# Patient Record
Sex: FEMALE | Race: BLACK OR AFRICAN AMERICAN | NOT HISPANIC OR LATINO | Employment: FULL TIME | URBAN - METROPOLITAN AREA
[De-identification: names, ages, dates, MRNs, and addresses within clinical notes are randomized per-mention and may not be internally consistent; named-entity substitution may affect disease eponyms.]

---

## 2023-04-13 ENCOUNTER — OFFICE VISIT (OUTPATIENT)
Dept: PRIMARY CARE | Facility: CLINIC | Age: 48
End: 2023-04-13
Payer: COMMERCIAL

## 2023-04-13 VITALS
SYSTOLIC BLOOD PRESSURE: 109 MMHG | HEIGHT: 64 IN | BODY MASS INDEX: 47.29 KG/M2 | TEMPERATURE: 97.4 F | HEART RATE: 92 BPM | WEIGHT: 277 LBS | DIASTOLIC BLOOD PRESSURE: 74 MMHG

## 2023-04-13 DIAGNOSIS — G47.30 SLEEP APNEA IN ADULT: ICD-10-CM

## 2023-04-13 DIAGNOSIS — E78.9 LIPID DISORDER: ICD-10-CM

## 2023-04-13 DIAGNOSIS — E11.69 TYPE 2 DIABETES MELLITUS WITH OTHER SPECIFIED COMPLICATION, UNSPECIFIED WHETHER LONG TERM INSULIN USE (MULTI): Primary | ICD-10-CM

## 2023-04-13 DIAGNOSIS — I10 HYPERTENSION, UNSPECIFIED TYPE: ICD-10-CM

## 2023-04-13 PROCEDURE — 3008F BODY MASS INDEX DOCD: CPT | Performed by: FAMILY MEDICINE

## 2023-04-13 PROCEDURE — 99214 OFFICE O/P EST MOD 30 MIN: CPT | Performed by: FAMILY MEDICINE

## 2023-04-13 PROCEDURE — 3078F DIAST BP <80 MM HG: CPT | Performed by: FAMILY MEDICINE

## 2023-04-13 PROCEDURE — 3074F SYST BP LT 130 MM HG: CPT | Performed by: FAMILY MEDICINE

## 2023-04-13 PROCEDURE — 1036F TOBACCO NON-USER: CPT | Performed by: FAMILY MEDICINE

## 2023-04-13 RX ORDER — METFORMIN HYDROCHLORIDE 1000 MG/1
1 TABLET ORAL 2 TIMES DAILY
COMMUNITY
Start: 2022-12-14 | End: 2023-07-18 | Stop reason: WASHOUT

## 2023-04-13 RX ORDER — TRIAMTERENE/HYDROCHLOROTHIAZID 37.5-25 MG
1 TABLET ORAL DAILY
COMMUNITY
Start: 2019-11-11 | End: 2024-02-28

## 2023-04-13 RX ORDER — METOPROLOL SUCCINATE 50 MG/1
1 TABLET, EXTENDED RELEASE ORAL DAILY
COMMUNITY
Start: 2021-04-30 | End: 2023-08-08

## 2023-04-13 RX ORDER — ESCITALOPRAM OXALATE 20 MG/1
20 TABLET ORAL DAILY
COMMUNITY
Start: 2023-03-07 | End: 2024-02-28

## 2023-04-13 RX ORDER — DULAGLUTIDE 3 MG/.5ML
INJECTION, SOLUTION SUBCUTANEOUS
COMMUNITY
Start: 2023-03-20 | End: 2023-08-14 | Stop reason: SDUPTHER

## 2023-04-13 RX ORDER — MONTELUKAST SODIUM 10 MG/1
1 TABLET ORAL DAILY
COMMUNITY
Start: 2022-06-21 | End: 2023-08-08

## 2023-04-13 RX ORDER — COLESTIPOL HYDROCHLORIDE 5 G/5G
5 GRANULE, FOR SUSPENSION ORAL DAILY
COMMUNITY
Start: 2022-12-14 | End: 2023-04-13

## 2023-04-13 RX ORDER — AMLODIPINE BESYLATE 5 MG/1
1 TABLET ORAL DAILY
COMMUNITY
Start: 2019-11-11 | End: 2023-04-13 | Stop reason: WASHOUT

## 2023-04-13 NOTE — PROGRESS NOTES
This is a 47-year-old female patient who is morbidly obese BMI of 47.55 sleep apnea diabetes hypertension    Today she has lost about 7 pounds and her blood pressure is very good I will discontinue the amlodipine    She will continue with the triamterene and metoprolol    She is taking Trulicity once a week 3 mg dose she is also taking the Lexapro and metformin 1000 once a day    I informed her if she does weight training and also do walking at least 45 minutes every day she will lose more weight    She says on and off she has been keeping a journal but has not been consistent encourage her to do so    She says CPAP she has been using it but certain days she might not use it    I referred her to the ophthalmologist    Since the lab is closed for the day she promised to come tomorrow for the lipid and A1c panel    Advised patient to come back in 3  months

## 2023-04-14 ENCOUNTER — APPOINTMENT (OUTPATIENT)
Dept: PRIMARY CARE | Facility: CLINIC | Age: 48
End: 2023-04-14
Payer: COMMERCIAL

## 2023-04-20 ENCOUNTER — CLINICAL SUPPORT (OUTPATIENT)
Dept: PRIMARY CARE | Facility: CLINIC | Age: 48
End: 2023-04-20
Payer: COMMERCIAL

## 2023-04-20 DIAGNOSIS — E11.69 TYPE 2 DIABETES MELLITUS WITH OTHER SPECIFIED COMPLICATION, UNSPECIFIED WHETHER LONG TERM INSULIN USE (MULTI): ICD-10-CM

## 2023-04-20 DIAGNOSIS — E78.9 LIPID DISORDER: ICD-10-CM

## 2023-04-20 PROCEDURE — 80061 LIPID PANEL: CPT

## 2023-04-20 PROCEDURE — 83036 HEMOGLOBIN GLYCOSYLATED A1C: CPT

## 2023-04-20 PROCEDURE — 36415 COLL VENOUS BLD VENIPUNCTURE: CPT | Performed by: FAMILY MEDICINE

## 2023-04-21 ENCOUNTER — E-VISIT (OUTPATIENT)
Dept: PRIMARY CARE | Facility: CLINIC | Age: 48
End: 2023-04-21
Payer: COMMERCIAL

## 2023-04-21 DIAGNOSIS — T78.40XA ALLERGIC REACTION, INITIAL ENCOUNTER: Primary | ICD-10-CM

## 2023-04-21 DIAGNOSIS — T78.40XD ALLERGIC REACTION, SUBSEQUENT ENCOUNTER: Primary | ICD-10-CM

## 2023-04-21 LAB
CHOLESTEROL (MG/DL) IN SER/PLAS: 161 MG/DL (ref 0–199)
CHOLESTEROL IN HDL (MG/DL) IN SER/PLAS: 47.3 MG/DL
CHOLESTEROL/HDL RATIO: 3.4
ESTIMATED AVERAGE GLUCOSE FOR HBA1C: 131 MG/DL
HEMOGLOBIN A1C/HEMOGLOBIN TOTAL IN BLOOD: 6.2 %
LDL: 92 MG/DL (ref 0–99)
TRIGLYCERIDE (MG/DL) IN SER/PLAS: 109 MG/DL (ref 0–149)
VLDL: 22 MG/DL (ref 0–40)

## 2023-04-21 PROCEDURE — 99213 OFFICE O/P EST LOW 20 MIN: CPT | Performed by: STUDENT IN AN ORGANIZED HEALTH CARE EDUCATION/TRAINING PROGRAM

## 2023-04-21 RX ORDER — PREDNISONE 20 MG/1
40 TABLET ORAL DAILY
Qty: 10 TABLET | Refills: 0 | Status: SHIPPED | OUTPATIENT
Start: 2023-04-21 | End: 2023-04-26

## 2023-07-18 ENCOUNTER — OFFICE VISIT (OUTPATIENT)
Dept: PRIMARY CARE | Facility: CLINIC | Age: 48
End: 2023-07-18
Payer: COMMERCIAL

## 2023-07-18 VITALS
HEIGHT: 64 IN | DIASTOLIC BLOOD PRESSURE: 84 MMHG | HEART RATE: 85 BPM | TEMPERATURE: 97.4 F | BODY MASS INDEX: 46.95 KG/M2 | SYSTOLIC BLOOD PRESSURE: 122 MMHG | WEIGHT: 275 LBS

## 2023-07-18 DIAGNOSIS — I10 HYPERTENSION, UNSPECIFIED TYPE: ICD-10-CM

## 2023-07-18 DIAGNOSIS — T78.40XA ALLERGIC REACTION, INITIAL ENCOUNTER: ICD-10-CM

## 2023-07-18 DIAGNOSIS — E11.69 TYPE 2 DIABETES MELLITUS WITH OTHER SPECIFIED COMPLICATION, UNSPECIFIED WHETHER LONG TERM INSULIN USE (MULTI): Primary | ICD-10-CM

## 2023-07-18 DIAGNOSIS — G47.30 SLEEP APNEA IN ADULT: ICD-10-CM

## 2023-07-18 PROCEDURE — 3008F BODY MASS INDEX DOCD: CPT | Performed by: FAMILY MEDICINE

## 2023-07-18 PROCEDURE — 3074F SYST BP LT 130 MM HG: CPT | Performed by: FAMILY MEDICINE

## 2023-07-18 PROCEDURE — 1036F TOBACCO NON-USER: CPT | Performed by: FAMILY MEDICINE

## 2023-07-18 PROCEDURE — 3044F HG A1C LEVEL LT 7.0%: CPT | Performed by: FAMILY MEDICINE

## 2023-07-18 PROCEDURE — 3079F DIAST BP 80-89 MM HG: CPT | Performed by: FAMILY MEDICINE

## 2023-07-18 PROCEDURE — 99214 OFFICE O/P EST MOD 30 MIN: CPT | Performed by: FAMILY MEDICINE

## 2023-07-18 RX ORDER — METFORMIN HYDROCHLORIDE 500 MG/1
2000 TABLET, EXTENDED RELEASE ORAL
Qty: 120 TABLET | Refills: 11 | Status: SHIPPED | OUTPATIENT
Start: 2023-07-18 | End: 2024-07-17

## 2023-07-18 NOTE — PROGRESS NOTES
"Subjective   Patient ID: Ralph Saldaña is a 47 y.o. female who presents for Follow-up.    HPI     Review of Systems    Objective   Temp 36.3 °C (97.4 °F)   Ht 1.626 m (5' 4\")   Wt 125 kg (275 lb)   BMI 47.20 kg/m²     Physical Exam    Assessment/Plan          "

## 2023-07-18 NOTE — PROGRESS NOTES
This is a 47-year-old female who is here for follow-up    I am still very concerned about her body mass index is 47.20 and she is on Trulicity 3 mg    She says it is very expensive for this reason I will send her to clinical pharmacy to find out whether we could arrange for some help for her to get the Trulicity    Also she cannot tolerate the regular metformin thousand twice a day that gives her the diarrhea    Advised her to take with antihistamine but I also changed her metformin to metformin XR hopefully this is covered for her    Advised her to walk up 1 mile a day    Advised her to do more meal preps and also referred her to the nutritionist    Advised her to wear the CPAP daily    Her next appointment would be for her annual physical that will be in December

## 2023-08-07 ENCOUNTER — TELEMEDICINE (OUTPATIENT)
Dept: PHARMACY | Facility: HOSPITAL | Age: 48
End: 2023-08-07
Payer: COMMERCIAL

## 2023-08-07 DIAGNOSIS — E11.69 TYPE 2 DIABETES MELLITUS WITH OTHER SPECIFIED COMPLICATION, UNSPECIFIED WHETHER LONG TERM INSULIN USE (MULTI): ICD-10-CM

## 2023-08-07 NOTE — PROGRESS NOTES
"Pharmacist Clinic: Diabetes Management  Ralph Saldaña is a 48 y.o. female was referred to Clinical Pharmacy Team for diabetes management.   Referring Provider: Evangelina Floyd MD     Subjective   HPI    HPI    - Provider is PCI; referral for help with cost of trulicity  - A1c in April = 6.2  - Current BMI = 47    Current diet: ***  Current exercise: ***    Current monitoring regimen:   Patient is using: {glucometer/continuous glucose monitor:38447}    Reported blood sugars: ***    Any episodes of hypoglycemia? {yes***/no:66927::\"no\"}    Adverse Effects:   ***    Allergies   Allergen Reactions    Bupropion Hcl Hives    Penicillins Hives and Unknown    Shellfish Derived Unknown       Objective     There were no vitals taken for this visit.    Diabetes Pharmacotherapy:    ***    SECONDARY PREVENTION  - Statin? {yes,no:21211}   - ACE-I/ARB? {yes,no:21211}  - Aspirin? {yes,no:21211}    Pertinent PMH Review:  - PMH of Pancreatitis: {YES,NO:24987}  - PMH of Retinopathy: {YES,NO:91089}  - PMH of Urinary Tract Infections: {YES,NO:20338}  - PMH of MTC: {YES,NO:68235}    Lab Review  Lab Results   Component Value Date    BILITOT 0.4 12/14/2022    CALCIUM 9.3 12/14/2022    CO2 28 12/14/2022     12/14/2022    CREATININE 0.80 12/14/2022    GLUCOSE 123 (H) 12/14/2022    ALKPHOS 100 12/14/2022    K 4.5 12/14/2022    PROT 7.2 12/14/2022     12/14/2022    AST 14 12/14/2022    ALT 15 12/14/2022    BUN 13 12/14/2022    ANIONGAP 16 12/14/2022    ALBUMIN 4.0 12/14/2022    GFRF >90 12/14/2022     Lab Results   Component Value Date    TRIG 109 04/20/2023    CHOL 161 04/20/2023    HDL 47.3 04/20/2023     Lab Results   Component Value Date    HGBA1C 6.2 (A) 04/20/2023    HGBA1C 7.2 (A) 12/14/2022     The 10-year ASCVD risk score (Giuseppe BERG, et al., 2019) is: 5.5%    Values used to calculate the score:      Age: 48 years      Sex: Female      Is Non- : Yes      Diabetic: Yes      Tobacco smoker: " No      Systolic Blood Pressure: 122 mmHg      Is BP treated: Yes      HDL Cholesterol: 47.3 mg/dL      Total Cholesterol: 161 mg/dL    Drug Interactions:  ***    Assessment/Plan   Problem List Items Addressed This Visit    None      Patients diabetes is {disease control degree:031690} with most recent A1c of ***% (Goal < 7%).   Initiate: ***  Continue: ***  Discontinue: ***   Compliance at present is estimated to be {good/fair/poor:74086}. Efforts to improve compliance (if necessary) will be directed at {compliance:33261}.  Education Provided to Patient: ***   Follow-up: ***    Cheri Blanco, PharmD, [facility]  Clinical Pharmacist     Continue all meds under the continuation of care with the referring provider and clinical pharmacy team.

## 2023-08-08 DIAGNOSIS — I10 HYPERTENSION, UNSPECIFIED TYPE: ICD-10-CM

## 2023-08-08 DIAGNOSIS — T78.40XS ALLERGY, SEQUELA: Primary | ICD-10-CM

## 2023-08-08 RX ORDER — MONTELUKAST SODIUM 10 MG/1
10 TABLET ORAL DAILY
Qty: 90 TABLET | Refills: 3 | Status: SHIPPED | OUTPATIENT
Start: 2023-08-08

## 2023-08-08 RX ORDER — METOPROLOL SUCCINATE 50 MG/1
50 TABLET, EXTENDED RELEASE ORAL DAILY
Qty: 90 TABLET | Refills: 3 | Status: SHIPPED | OUTPATIENT
Start: 2023-08-08

## 2023-08-14 ENCOUNTER — TELEMEDICINE (OUTPATIENT)
Dept: PHARMACY | Facility: HOSPITAL | Age: 48
End: 2023-08-14
Payer: COMMERCIAL

## 2023-08-14 DIAGNOSIS — E11.69 TYPE 2 DIABETES MELLITUS WITH OTHER SPECIFIED COMPLICATION, UNSPECIFIED WHETHER LONG TERM INSULIN USE (MULTI): ICD-10-CM

## 2023-08-14 DIAGNOSIS — E11.9 TYPE 2 DIABETES MELLITUS WITHOUT COMPLICATION, WITHOUT LONG-TERM CURRENT USE OF INSULIN (MULTI): Primary | ICD-10-CM

## 2023-08-14 RX ORDER — DULAGLUTIDE 3 MG/.5ML
3 INJECTION, SOLUTION SUBCUTANEOUS
Qty: 2 ML | Refills: 5 | Status: SHIPPED | OUTPATIENT
Start: 2023-08-14 | End: 2023-08-24 | Stop reason: SDUPTHER

## 2023-08-14 ASSESSMENT — ENCOUNTER SYMPTOMS
NAIL CHANGES: 0
EYE PAIN: 0
FATIGUE: 0
VOMITING: 0
RHINORRHEA: 0
SORE THROAT: 0
SHORTNESS OF BREATH: 0
COUGH: 0
ANOREXIA: 0
FEVER: 0
DIARRHEA: 0

## 2023-08-14 NOTE — ASSESSMENT & PLAN NOTE
Patient's diabetes is currently well controlled with an A1c of 6.2% (goal <7%). Patient was referred for cost assistance for her Trulicity. Patient has her 1040 form available and will send it to this author to forward on as a part of the application.      Patient Assistance Screening (VAF)    Patient verbally reports monthly or yearly income which is less than 400% federal poverty level     Application for program has been submitted for the following medications: Trulicity    Patient plans to send her 1040 documentation to this author to be send with her application.     Patient aware this process may take up to 6 weeks.     If approved medication must be filled through [facility] pharmacy and may be picked up or mailed to patient.     Plan:   CONTINUE metformin and Trulicity   Prescription for Trulicity 3mg sent to [facility] for  PAP and to be mailed to the patient.

## 2023-08-14 NOTE — PROGRESS NOTES
Subjective   Patient ID: Ralph Saldaña is a 48 y.o. female who presents for Diabetes (Trulicity Cost Assistance).    Referring Provider: Evangelina Floyd MD       Objective     There were no vitals taken for this visit.     Labs  Lab Results   Component Value Date    BILITOT 0.4 12/14/2022    CALCIUM 9.3 12/14/2022    CO2 28 12/14/2022     12/14/2022    CREATININE 0.80 12/14/2022    GLUCOSE 123 (H) 12/14/2022    ALKPHOS 100 12/14/2022    K 4.5 12/14/2022    PROT 7.2 12/14/2022     12/14/2022    AST 14 12/14/2022    ALT 15 12/14/2022    BUN 13 12/14/2022    ANIONGAP 16 12/14/2022    ALBUMIN 4.0 12/14/2022    GFRF >90 12/14/2022     Lab Results   Component Value Date    TRIG 109 04/20/2023    CHOL 161 04/20/2023    HDL 47.3 04/20/2023     Lab Results   Component Value Date    HGBA1C 6.2 (A) 04/20/2023       Current Outpatient Medications on File Prior to Visit   Medication Sig Dispense Refill    escitalopram (Lexapro) 20 mg tablet Take 1 tablet (20 mg) by mouth once daily.      metFORMIN XR (Glucophage-XR) 500 mg 24 hr tablet Take 4 tablets (2,000 mg) by mouth once daily with a meal. Do not crush, chew, or split. 120 tablet 11    metoprolol succinate XL (Toprol-XL) 50 mg 24 hr tablet TAKE 1 TABLET DAILY 90 tablet 3    montelukast (Singulair) 10 mg tablet TAKE 1 TABLET DAILY 90 tablet 3    triamterene-hydrochlorothiazid (Maxzide-25) 37.5-25 mg tablet Take 1 tablet by mouth once daily.      Trulicity 3 mg/0.5 mL pen injector INJECT 3MG UNDER THE SKIN EVERY WEEK      [DISCONTINUED] metoprolol succinate XL (Toprol-XL) 50 mg 24 hr tablet Take 1 tablet (50 mg) by mouth once daily.      [DISCONTINUED] montelukast (Singulair) 10 mg tablet Take 1 tablet (10 mg) by mouth once daily.       No current facility-administered medications on file prior to visit.        Assessment/Plan   Problem List Items Addressed This Visit       Type 2 diabetes mellitus without complication, without long-term current use of  insulin (CMS/[facility]) - Primary     Patient's diabetes is currently well controlled with an A1c of 6.2% (goal <7%). Patient was referred for cost assistance for her Trulicity. Patient has her 1040 form available and will send it to this author to forward on as a part of the application.      Patient Assistance Screening (VAF)    Patient verbally reports monthly or yearly income which is less than 400% federal poverty level     Application for program has been submitted for the following medications: Trulicity    Patient plans to send her 1040 documentation to this author to be send with her application.     Patient aware this process may take up to 6 weeks.     If approved medication must be filled through [facility] pharmacy and may be picked up or mailed to patient.     Plan:   CONTINUE metformin and Trulicity   Prescription for Trulicity 3mg sent to [facility] for  PAP and to be mailed to the patient.               Other Visit Diagnoses       Type 2 diabetes mellitus with other specified complication, unspecified whether long term insulin use (CMS/[facility])              Follow Up: 2 weeks to review patient assistance process.     Kayleigh Torres, PharmD    Continue all meds under the continuation of care with the referring provider and clinical pharmacy team.

## 2023-08-16 ENCOUNTER — OFFICE VISIT (OUTPATIENT)
Dept: PRIMARY CARE | Facility: CLINIC | Age: 48
End: 2023-08-16
Payer: COMMERCIAL

## 2023-08-16 VITALS
HEART RATE: 80 BPM | SYSTOLIC BLOOD PRESSURE: 124 MMHG | BODY MASS INDEX: 47.29 KG/M2 | TEMPERATURE: 97.5 F | HEIGHT: 64 IN | DIASTOLIC BLOOD PRESSURE: 82 MMHG | WEIGHT: 277 LBS

## 2023-08-16 DIAGNOSIS — L30.9 DERMATITIS: Primary | ICD-10-CM

## 2023-08-16 DIAGNOSIS — I10 HYPERTENSION, UNSPECIFIED TYPE: ICD-10-CM

## 2023-08-16 DIAGNOSIS — E11.69 TYPE 2 DIABETES MELLITUS WITH OTHER SPECIFIED COMPLICATION, UNSPECIFIED WHETHER LONG TERM INSULIN USE (MULTI): ICD-10-CM

## 2023-08-16 DIAGNOSIS — G47.30 SLEEP APNEA IN ADULT: ICD-10-CM

## 2023-08-16 PROCEDURE — 1036F TOBACCO NON-USER: CPT | Performed by: FAMILY MEDICINE

## 2023-08-16 PROCEDURE — 3079F DIAST BP 80-89 MM HG: CPT | Performed by: FAMILY MEDICINE

## 2023-08-16 PROCEDURE — 3008F BODY MASS INDEX DOCD: CPT | Performed by: FAMILY MEDICINE

## 2023-08-16 PROCEDURE — 3074F SYST BP LT 130 MM HG: CPT | Performed by: FAMILY MEDICINE

## 2023-08-16 PROCEDURE — 3044F HG A1C LEVEL LT 7.0%: CPT | Performed by: FAMILY MEDICINE

## 2023-08-16 PROCEDURE — 99214 OFFICE O/P EST MOD 30 MIN: CPT | Performed by: FAMILY MEDICINE

## 2023-08-16 RX ORDER — PERMETHRIN 50 MG/G
CREAM TOPICAL ONCE
Qty: 60 G | Refills: 0 | Status: SHIPPED | OUTPATIENT
Start: 2023-08-16 | End: 2023-08-16 | Stop reason: ENTERED-IN-ERROR

## 2023-08-16 RX ORDER — PERMETHRIN 50 MG/G
CREAM TOPICAL ONCE
Qty: 60 G | Refills: 0 | Status: SHIPPED | OUTPATIENT
Start: 2023-08-16 | End: 2023-08-16

## 2023-08-16 RX ORDER — HYDROXYZINE HYDROCHLORIDE 10 MG/1
10 TABLET, FILM COATED ORAL EVERY 8 HOURS PRN
Qty: 30 TABLET | Refills: 0 | Status: SHIPPED | OUTPATIENT
Start: 2023-08-16 | End: 2023-08-26

## 2023-08-16 RX ORDER — HYDROCORTISONE 25 MG/G
CREAM TOPICAL 2 TIMES DAILY PRN
Qty: 30 G | Refills: 2 | Status: SHIPPED | OUTPATIENT
Start: 2023-08-16 | End: 2024-08-15

## 2023-08-16 NOTE — PROGRESS NOTES
This is a 48-year-old female who is here presenting with rash that developed overnight    She is experiencing a lot of itching under her breast around her waist and between the buttocks    On exam she has excoriating rash under her breast back of her upper body and buttocks    Around the umbilicus or between the fingers I do not see a rash    My first suspicion is scabies therefore I provided education about the current scabies and its contagious    Permethrin is prescribed    Also for itching Atarax was given advised her not to take Lexapro when she is taking Atarax hydrocortisone cream was given to apply if she is having areas of itching    Also I referred her to dermatology    Other issues hypertension diabetes it is under control advised her to keep her appointment with me for December

## 2023-08-16 NOTE — PATIENT INSTRUCTIONS
I am suspecting scabies.  It is a contagious disease therefore your whole family has to be treated with the Prometrium lotion may be there to go to the doctor and do your laundry the directions are given on this paper    Also I gave you a medicine called hydroxyzine 10 mg you can take it whenever necessary up to 8 hours that is 3 times a day but when you are taking it you cannot take the escitalopram that is the stress tablet but if there is no itching you do not need to take it    Also I gave her hydrocortisone cream for the sites that the you are itching      This is a suspicion I want you to see the dermatologist skin specialist whom I am going to refer you

## 2023-08-24 ENCOUNTER — TELEMEDICINE (OUTPATIENT)
Dept: PHARMACY | Facility: HOSPITAL | Age: 48
End: 2023-08-24
Payer: COMMERCIAL

## 2023-08-24 DIAGNOSIS — E11.9 TYPE 2 DIABETES MELLITUS WITHOUT COMPLICATION, WITHOUT LONG-TERM CURRENT USE OF INSULIN (MULTI): ICD-10-CM

## 2023-08-24 RX ORDER — DULAGLUTIDE 3 MG/.5ML
3 INJECTION, SOLUTION SUBCUTANEOUS
Qty: 2 ML | Refills: 5 | Status: SHIPPED | OUTPATIENT
Start: 2023-08-24 | End: 2023-12-19 | Stop reason: WASHOUT

## 2023-08-24 RX ORDER — DULAGLUTIDE 3 MG/.5ML
3 INJECTION, SOLUTION SUBCUTANEOUS
Qty: 2 ML | Refills: 5 | Status: SHIPPED | OUTPATIENT
Start: 2023-08-24 | End: 2023-08-24 | Stop reason: SDUPTHER

## 2023-08-24 NOTE — ASSESSMENT & PLAN NOTE
Patient was referred for cost assistance related to Trulicity. However, her insurance will not allow for coverage at  Pharmacies, which disqualifies her for patient assistance. I discussed with her the different options. For now, this author will sent refills of Trulicity to [facility] with a new coupon for her to use. Spoke with patient and made her aware of the situation.   Informed patient that if this cost is too high, she may have to consider switching to another agent or speak with her  during insurance open enrollment to see if there is a plan that better covers this medication. Patient verbalizes understanding.    Plan:   CONTINUE Trulicity 3mg weekly  Prescription sent to [facility] per patient request    Follow Up: as requested per patient or PCP

## 2023-08-24 NOTE — PROGRESS NOTES
Subjective   Patient ID: Ralph Saldaña is a 48 y.o. female who presents for Diabetes (Trulicity cost assistance).    Referring Provider: Evangelina Floyd MD       Objective     There were no vitals taken for this visit.     Labs  Lab Results   Component Value Date    BILITOT 0.4 12/14/2022    CALCIUM 9.3 12/14/2022    CO2 28 12/14/2022     12/14/2022    CREATININE 0.80 12/14/2022    GLUCOSE 123 (H) 12/14/2022    ALKPHOS 100 12/14/2022    K 4.5 12/14/2022    PROT 7.2 12/14/2022     12/14/2022    AST 14 12/14/2022    ALT 15 12/14/2022    BUN 13 12/14/2022    ANIONGAP 16 12/14/2022    ALBUMIN 4.0 12/14/2022    GFRF >90 12/14/2022     Lab Results   Component Value Date    TRIG 109 04/20/2023    CHOL 161 04/20/2023    HDL 47.3 04/20/2023     Lab Results   Component Value Date    HGBA1C 6.2 (A) 04/20/2023       Current Outpatient Medications on File Prior to Visit   Medication Sig Dispense Refill    escitalopram (Lexapro) 20 mg tablet Take 1 tablet (20 mg) by mouth once daily.      hydrocortisone 2.5 % cream Apply topically 2 times a day as needed for irritation or rash. 30 g 2    hydrOXYzine HCL (Atarax) 10 mg tablet Take 1 tablet (10 mg) by mouth every 8 hours if needed for itching for up to 10 days. 30 tablet 0    metFORMIN XR (Glucophage-XR) 500 mg 24 hr tablet Take 4 tablets (2,000 mg) by mouth once daily with a meal. Do not crush, chew, or split. 120 tablet 11    metoprolol succinate XL (Toprol-XL) 50 mg 24 hr tablet TAKE 1 TABLET DAILY 90 tablet 3    montelukast (Singulair) 10 mg tablet TAKE 1 TABLET DAILY 90 tablet 3    triamterene-hydrochlorothiazid (Maxzide-25) 37.5-25 mg tablet Take 1 tablet by mouth once daily.      Trulicity 3 mg/0.5 mL pen injector Inject 3 mg under the skin 1 (one) time per week. 2 mL 5     No current facility-administered medications on file prior to visit.        Assessment/Plan   Problem List Items Addressed This Visit       Type 2 diabetes mellitus without  complication, without long-term current use of insulin (CMS/[facility])     Patient was referred for cost assistance related to Trulicity. However, her insurance will not allow for coverage at  Pharmacies, which disqualifies her for patient assistance. I discussed with her the different options. For now, this author will sent refills of Trulicity to [facility] with a new coupon for her to use. Spoke with patient and made her aware of the situation.   Informed patient that if this cost is too high, she may have to consider switching to another agent or speak with her  during insurance open enrollment to see if there is a plan that better covers this medication. Patient verbalizes understanding.    Plan:   CONTINUE Trulicity 3mg weekly  Prescription sent to [facility] per patient request    Follow Up: as requested per patient or PCP            Kayleigh Torres, PharmD    Continue all meds under the continuation of care with the referring provider and clinical pharmacy team.

## 2023-08-28 ENCOUNTER — APPOINTMENT (OUTPATIENT)
Dept: PHARMACY | Facility: HOSPITAL | Age: 48
End: 2023-08-28
Payer: COMMERCIAL

## 2023-12-13 ENCOUNTER — APPOINTMENT (OUTPATIENT)
Dept: PRIMARY CARE | Facility: CLINIC | Age: 48
End: 2023-12-13
Payer: COMMERCIAL

## 2023-12-19 ENCOUNTER — OFFICE VISIT (OUTPATIENT)
Dept: PRIMARY CARE | Facility: CLINIC | Age: 48
End: 2023-12-19
Payer: COMMERCIAL

## 2023-12-19 VITALS
SYSTOLIC BLOOD PRESSURE: 121 MMHG | BODY MASS INDEX: 46.52 KG/M2 | HEIGHT: 66 IN | WEIGHT: 289.5 LBS | HEART RATE: 85 BPM | TEMPERATURE: 97.9 F | DIASTOLIC BLOOD PRESSURE: 80 MMHG

## 2023-12-19 DIAGNOSIS — Z00.00 ROUTINE GENERAL MEDICAL EXAMINATION AT A HEALTH CARE FACILITY: ICD-10-CM

## 2023-12-19 DIAGNOSIS — E11.69 TYPE 2 DIABETES MELLITUS WITH OTHER SPECIFIED COMPLICATION, UNSPECIFIED WHETHER LONG TERM INSULIN USE (MULTI): ICD-10-CM

## 2023-12-19 DIAGNOSIS — I10 HYPERTENSION, UNSPECIFIED TYPE: ICD-10-CM

## 2023-12-19 DIAGNOSIS — Z20.2 STD EXPOSURE: ICD-10-CM

## 2023-12-19 DIAGNOSIS — E55.9 VITAMIN D DEFICIENCY: Primary | ICD-10-CM

## 2023-12-19 DIAGNOSIS — Z12.39 ENCOUNTER FOR SCREENING BREAST EXAMINATION: ICD-10-CM

## 2023-12-19 DIAGNOSIS — Z01.411 ENCOUNTER FOR GYNECOLOGICAL EXAMINATION WITH ABNORMAL FINDING: ICD-10-CM

## 2023-12-19 PROCEDURE — 82306 VITAMIN D 25 HYDROXY: CPT

## 2023-12-19 PROCEDURE — 3079F DIAST BP 80-89 MM HG: CPT | Performed by: FAMILY MEDICINE

## 2023-12-19 PROCEDURE — 86780 TREPONEMA PALLIDUM: CPT

## 2023-12-19 PROCEDURE — 36415 COLL VENOUS BLD VENIPUNCTURE: CPT

## 2023-12-19 PROCEDURE — G0444 DEPRESSION SCREEN ANNUAL: HCPCS | Performed by: FAMILY MEDICINE

## 2023-12-19 PROCEDURE — 87340 HEPATITIS B SURFACE AG IA: CPT

## 2023-12-19 PROCEDURE — 99396 PREV VISIT EST AGE 40-64: CPT | Performed by: FAMILY MEDICINE

## 2023-12-19 PROCEDURE — 3044F HG A1C LEVEL LT 7.0%: CPT | Performed by: FAMILY MEDICINE

## 2023-12-19 PROCEDURE — 87389 HIV-1 AG W/HIV-1&-2 AB AG IA: CPT

## 2023-12-19 PROCEDURE — 3074F SYST BP LT 130 MM HG: CPT | Performed by: FAMILY MEDICINE

## 2023-12-19 PROCEDURE — 84443 ASSAY THYROID STIM HORMONE: CPT

## 2023-12-19 PROCEDURE — 3008F BODY MASS INDEX DOCD: CPT | Performed by: FAMILY MEDICINE

## 2023-12-19 PROCEDURE — 1036F TOBACCO NON-USER: CPT | Performed by: FAMILY MEDICINE

## 2023-12-19 PROCEDURE — 80061 LIPID PANEL: CPT

## 2023-12-19 PROCEDURE — 85025 COMPLETE CBC W/AUTO DIFF WBC: CPT

## 2023-12-19 PROCEDURE — 80053 COMPREHEN METABOLIC PANEL: CPT

## 2023-12-19 PROCEDURE — 86803 HEPATITIS C AB TEST: CPT

## 2023-12-19 PROCEDURE — 83036 HEMOGLOBIN GLYCOSYLATED A1C: CPT

## 2023-12-19 PROCEDURE — G0446 INTENS BEHAVE THER CARDIO DX: HCPCS | Performed by: FAMILY MEDICINE

## 2023-12-19 PROCEDURE — 87800 DETECT AGNT MULT DNA DIREC: CPT

## 2023-12-19 PROCEDURE — 82248 BILIRUBIN DIRECT: CPT

## 2023-12-19 ASSESSMENT — ENCOUNTER SYMPTOMS: ARTHRALGIAS: 1

## 2023-12-19 NOTE — PATIENT INSTRUCTIONS
Work out that I would like you to start is walking on the spot as speedy as you can for 1 minute every hour set up alarm example is if you get up at 7 in the morning keep an alarm from 8 am at 8 AM 9 AM 10 AM so forth so forth and when the alarm goes off no matter what you are doing get up and speed walk for 1 minute lets start there      Also include a 1 heaped tablespoonful of Metamucil with 8 ounces of water twice a day       right shoulder pain you have sprained your bicep tendon you need to recondition that by doing bicep curls with 2 pound to 5 pound weights that you can grab around in the house like a heavy canned / frozen vegetables or even buy yourself 2 to 5 pound dumbbells and work on strengthening your bicep muscle on both arms--10 reps 2 sets daily    Also bent forward and do wide circles so that you will not get frozen shoulder--- then down and do white circles 5 circles if possible 3 times a day

## 2023-12-19 NOTE — PROGRESS NOTES
"Subjective   Patient ID: Ralph Saldaña is a 48 y.o. female who presents for Annual Exam.    HPI     Review of Systems   Musculoskeletal:  Positive for arthralgias.   All other systems reviewed and are negative.      Objective   /80   Pulse 85   Temp 36.6 °C (97.9 °F)   Ht 1.676 m (5' 6\")   Wt 131 kg (289 lb 8 oz)   BMI 46.73 kg/m²     Physical Exam  HENT:      Head: Atraumatic.   Cardiovascular:      Rate and Rhythm: Regular rhythm.   Pulmonary:      Breath sounds: Normal breath sounds.   Abdominal:      Palpations: Abdomen is soft.   Musculoskeletal:         General: Normal range of motion.      Cervical back: Normal range of motion.   Skin:     General: Skin is warm.   Neurological:      General: No focal deficit present.      Mental Status: She is alert.   Psychiatric:         Mood and Affect: Mood normal.         Assessment/Plan     This is a 48-year-old female who is here for her annual well exam    She says there was a time that she was feeling extremely depressed when she lost her pet dog    Now she is feeling better she has cleaned her house made some friends and she has good social support    I showed her some exercise movements ; placed a plan for her to work on to work on her weight    Continue with Lexapro for depression her mood is much better now    After she comes back in 1 month we will reevaluate and if she needs to see a psychologist I would arrange for that    Advised patient to continue with metformin she cannot afford taking Trulicity for which I advised her to take tablespoon  of Metamucil twice a day with 8 ounces of water    For the right shoulder pain she has bicipital tendinitis advised her on exercise to avoid frozen shoulder and strengthening of the biceps muscle    Patient again reminded for her to continue wearing the CPAP machine      I discussed and assess the  risk factors for cardiovascular disease.  I educated patient on a healthier behavior lifestyle changes.   I " advised patient to walk at least 30 minutes a day 5 days a week.  I educated on healthy eating habits and also taking a baby aspirin to avoid cardiovascular accident     Referred her to a new gynecologist she was seen for endometrial hyperplasia last year    Mammogram was ordered    Routine labs were done    Advised patient to come back in 1 month

## 2023-12-19 NOTE — LETTER
December 19, 2023     Patient: Ralph Saldaña   YOB: 1975   Date of Visit: 12/19/2023       To Whom It May Concern:    Ralph Saldaña was seen in my clinic on 12/19/2023 at 7:45 am. Please excuse Ralph for her absence from work on this day to make the appointment.    If you have any questions or concerns, please don't hesitate to call.         Sincerely,         Evangelina Floyd MD        CC: No Recipients

## 2023-12-20 LAB
25(OH)D3 SERPL-MCNC: 35 NG/ML (ref 30–100)
ALBUMIN SERPL BCP-MCNC: 4.1 G/DL (ref 3.4–5)
ALP SERPL-CCNC: 94 U/L (ref 33–110)
ALT SERPL W P-5'-P-CCNC: 18 U/L (ref 7–45)
ANION GAP SERPL CALC-SCNC: 13 MMOL/L (ref 10–20)
AST SERPL W P-5'-P-CCNC: 12 U/L (ref 9–39)
BASOPHILS # BLD AUTO: 0.04 X10*3/UL (ref 0–0.1)
BASOPHILS NFR BLD AUTO: 0.6 %
BILIRUB DIRECT SERPL-MCNC: 0.1 MG/DL (ref 0–0.3)
BILIRUB SERPL-MCNC: 0.6 MG/DL (ref 0–1.2)
BUN SERPL-MCNC: 15 MG/DL (ref 6–23)
C TRACH RRNA SPEC QL NAA+PROBE: NEGATIVE
CALCIUM SERPL-MCNC: 9.4 MG/DL (ref 8.6–10.6)
CHLORIDE SERPL-SCNC: 104 MMOL/L (ref 98–107)
CHOLEST SERPL-MCNC: 193 MG/DL (ref 0–199)
CHOLESTEROL/HDL RATIO: 3.9
CO2 SERPL-SCNC: 28 MMOL/L (ref 21–32)
CREAT SERPL-MCNC: 0.75 MG/DL (ref 0.5–1.05)
EOSINOPHIL # BLD AUTO: 0.11 X10*3/UL (ref 0–0.7)
EOSINOPHIL NFR BLD AUTO: 1.6 %
ERYTHROCYTE [DISTWIDTH] IN BLOOD BY AUTOMATED COUNT: 13.2 % (ref 11.5–14.5)
EST. AVERAGE GLUCOSE BLD GHB EST-MCNC: 151 MG/DL
GFR SERPL CREATININE-BSD FRML MDRD: >90 ML/MIN/1.73M*2
GLUCOSE SERPL-MCNC: 140 MG/DL (ref 74–99)
HBA1C MFR BLD: 6.9 %
HBV SURFACE AG SERPL QL IA: NONREACTIVE
HCT VFR BLD AUTO: 41.6 % (ref 36–46)
HCV AB SER QL: NONREACTIVE
HDLC SERPL-MCNC: 49.1 MG/DL
HGB BLD-MCNC: 13.3 G/DL (ref 12–16)
HIV 1+2 AB+HIV1 P24 AG SERPL QL IA: NONREACTIVE
IMM GRANULOCYTES # BLD AUTO: 0.02 X10*3/UL (ref 0–0.7)
IMM GRANULOCYTES NFR BLD AUTO: 0.3 % (ref 0–0.9)
LDLC SERPL CALC-MCNC: 120 MG/DL
LYMPHOCYTES # BLD AUTO: 2.7 X10*3/UL (ref 1.2–4.8)
LYMPHOCYTES NFR BLD AUTO: 39.8 %
MCH RBC QN AUTO: 28.4 PG (ref 26–34)
MCHC RBC AUTO-ENTMCNC: 32 G/DL (ref 32–36)
MCV RBC AUTO: 89 FL (ref 80–100)
MONOCYTES # BLD AUTO: 0.42 X10*3/UL (ref 0.1–1)
MONOCYTES NFR BLD AUTO: 6.2 %
N GONORRHOEA DNA SPEC QL PROBE+SIG AMP: NEGATIVE
NEUTROPHILS # BLD AUTO: 3.49 X10*3/UL (ref 1.2–7.7)
NEUTROPHILS NFR BLD AUTO: 51.5 %
NON HDL CHOLESTEROL: 144 MG/DL (ref 0–149)
NRBC BLD-RTO: 0 /100 WBCS (ref 0–0)
PLATELET # BLD AUTO: 327 X10*3/UL (ref 150–450)
POTASSIUM SERPL-SCNC: 3.8 MMOL/L (ref 3.5–5.3)
PROT SERPL-MCNC: 7.1 G/DL (ref 6.4–8.2)
RBC # BLD AUTO: 4.69 X10*6/UL (ref 4–5.2)
SODIUM SERPL-SCNC: 141 MMOL/L (ref 136–145)
T PALLIDUM AB SER QL: NONREACTIVE
TRIGL SERPL-MCNC: 122 MG/DL (ref 0–149)
TSH SERPL-ACNC: 2.56 MIU/L (ref 0.44–3.98)
VLDL: 24 MG/DL (ref 0–40)
WBC # BLD AUTO: 6.8 X10*3/UL (ref 4.4–11.3)

## 2024-01-24 ENCOUNTER — APPOINTMENT (OUTPATIENT)
Dept: PRIMARY CARE | Facility: CLINIC | Age: 49
End: 2024-01-24
Payer: COMMERCIAL

## 2024-02-27 ENCOUNTER — OFFICE VISIT (OUTPATIENT)
Dept: PRIMARY CARE | Facility: CLINIC | Age: 49
End: 2024-02-27
Payer: COMMERCIAL

## 2024-02-27 VITALS
BODY MASS INDEX: 48.86 KG/M2 | DIASTOLIC BLOOD PRESSURE: 95 MMHG | HEIGHT: 64 IN | TEMPERATURE: 97.6 F | HEART RATE: 93 BPM | SYSTOLIC BLOOD PRESSURE: 142 MMHG | WEIGHT: 286.2 LBS

## 2024-02-27 DIAGNOSIS — I10 HYPERTENSION, UNSPECIFIED TYPE: ICD-10-CM

## 2024-02-27 DIAGNOSIS — F43.9 STRESS: ICD-10-CM

## 2024-02-27 DIAGNOSIS — L73.2 HIDRADENITIS: ICD-10-CM

## 2024-02-27 DIAGNOSIS — E11.9 TYPE 2 DIABETES MELLITUS WITHOUT COMPLICATION, WITHOUT LONG-TERM CURRENT USE OF INSULIN (MULTI): ICD-10-CM

## 2024-02-27 DIAGNOSIS — F43.9 STRESS: Primary | ICD-10-CM

## 2024-02-27 DIAGNOSIS — E78.9 LIPID DISORDER: ICD-10-CM

## 2024-02-27 DIAGNOSIS — G47.30 SLEEP APNEA IN ADULT: Primary | ICD-10-CM

## 2024-02-27 LAB — POC FINGERSTICK BLOOD GLUCOSE: 168 MG/DL (ref 70–100)

## 2024-02-27 PROCEDURE — 4010F ACE/ARB THERAPY RXD/TAKEN: CPT | Performed by: FAMILY MEDICINE

## 2024-02-27 PROCEDURE — 82962 GLUCOSE BLOOD TEST: CPT | Performed by: FAMILY MEDICINE

## 2024-02-27 PROCEDURE — 3080F DIAST BP >= 90 MM HG: CPT | Performed by: FAMILY MEDICINE

## 2024-02-27 PROCEDURE — 99214 OFFICE O/P EST MOD 30 MIN: CPT | Performed by: FAMILY MEDICINE

## 2024-02-27 PROCEDURE — 1036F TOBACCO NON-USER: CPT | Performed by: FAMILY MEDICINE

## 2024-02-27 PROCEDURE — 3077F SYST BP >= 140 MM HG: CPT | Performed by: FAMILY MEDICINE

## 2024-02-27 PROCEDURE — 3008F BODY MASS INDEX DOCD: CPT | Performed by: FAMILY MEDICINE

## 2024-02-27 RX ORDER — CLINDAMYCIN PHOSPHATE 11.9 MG/ML
SOLUTION TOPICAL 2 TIMES DAILY
Qty: 60 ML | Refills: 5 | Status: SHIPPED | OUTPATIENT
Start: 2024-02-27 | End: 2024-10-24

## 2024-02-27 RX ORDER — LOSARTAN POTASSIUM 50 MG/1
50 TABLET ORAL DAILY
Qty: 30 TABLET | Refills: 11 | Status: SHIPPED | OUTPATIENT
Start: 2024-02-27 | End: 2024-05-31 | Stop reason: SDUPTHER

## 2024-02-27 NOTE — PROGRESS NOTES
This is a 48-year-old female who is here for follow-up    Her sugar has been high and she is under a lot of stress    She says that she rest the CPAP as needed basis    She says she cannot tolerate metformin it makes her nauseous    Blood pressure is high    Also she says she gets boils under her axilla    On exam her vital signs shows that her blood pressure is high BMI of 49     Again I spoke to her at length about lifestyle changes    Advised her to see clinical pharmacy to renew her prescription for the GLP-1 and also talk to them about metformin different formula    She is under a lot of stress continue the Lexapro and also I referred her to access clinic    For her blood pressure advised her to continue the triamterene metoprolol losartan was added    She has hidradenitis on both axilla prescribe clindamycin    Advised her to come back in 3 months also reminded her to get the mammogram

## 2024-02-27 NOTE — PATIENT INSTRUCTIONS
\  1.  Every day sleep  at night or rest ( some days we are unable to sleep )around the same time without the TV-this is important habit to reduce dementia and aging prematurely    2.  Eat 3 cups of green leafy vegetables daily include at least 1 fruit.,  Reduce animal protein consumption-/ also  Starch  consumption  --this will help to lose weight avoid illnesses such as dementia high blood pressure cancer.,  Diabetes    3.  Exercise including aerobics as well as resistant exercise for at least 45 minutes a day for 5 days this will also help to reduce premature aging     Since you are dealing with stress and stress eating you will have to have a permanent solution I have referred you to access clinic to see the psychiatrist and the psychologist    Regarding the diabetes get a get him back on Trulicity or Ozempic and also to find out whether there will be a different formula of metformin you need to speak to clinical pharmacy for PhD doctors that you already have met and please let them know where to send the prescription    For the high blood pressure you need to continue with your triamterene metoprolol but I also added losartan    Continue with escitalopram for stress    Please make sure that you will schedule your mammogram    Please make sure that you will wear your CPAP daily    Walking outside will be a good stress reliever you had to push yourself at least walk a block every day      3 months

## 2024-02-28 RX ORDER — TRIAMTERENE/HYDROCHLOROTHIAZID 37.5-25 MG
1 TABLET ORAL DAILY
Qty: 90 TABLET | Refills: 3 | Status: SHIPPED | OUTPATIENT
Start: 2024-02-28

## 2024-02-28 RX ORDER — ESCITALOPRAM OXALATE 20 MG/1
20 TABLET ORAL DAILY
Qty: 90 TABLET | Refills: 3 | Status: SHIPPED | OUTPATIENT
Start: 2024-02-28

## 2024-05-15 ENCOUNTER — APPOINTMENT (OUTPATIENT)
Dept: PRIMARY CARE | Facility: CLINIC | Age: 49
End: 2024-05-15
Payer: COMMERCIAL

## 2024-05-31 DIAGNOSIS — I10 HYPERTENSION, UNSPECIFIED TYPE: ICD-10-CM

## 2024-06-03 RX ORDER — LOSARTAN POTASSIUM 50 MG/1
50 TABLET ORAL DAILY
Qty: 90 TABLET | Refills: 3 | Status: SHIPPED | OUTPATIENT
Start: 2024-06-03 | End: 2025-06-03

## 2024-06-04 ENCOUNTER — OFFICE VISIT (OUTPATIENT)
Dept: PRIMARY CARE | Facility: CLINIC | Age: 49
End: 2024-06-04
Payer: COMMERCIAL

## 2024-06-04 VITALS
BODY MASS INDEX: 47.97 KG/M2 | HEIGHT: 64 IN | SYSTOLIC BLOOD PRESSURE: 115 MMHG | WEIGHT: 281 LBS | TEMPERATURE: 97.5 F | DIASTOLIC BLOOD PRESSURE: 82 MMHG

## 2024-06-04 DIAGNOSIS — E11.69 TYPE 2 DIABETES MELLITUS WITH OTHER SPECIFIED COMPLICATION, UNSPECIFIED WHETHER LONG TERM INSULIN USE (MULTI): ICD-10-CM

## 2024-06-04 DIAGNOSIS — G47.30 SLEEP APNEA IN ADULT: Primary | ICD-10-CM

## 2024-06-04 LAB
HBA1C MFR BLD: 7.4 % (ref 4.2–6.5)
POC FINGERSTICK BLOOD GLUCOSE: 124 MG/DL (ref 70–100)

## 2024-06-04 PROCEDURE — 4010F ACE/ARB THERAPY RXD/TAKEN: CPT | Performed by: FAMILY MEDICINE

## 2024-06-04 PROCEDURE — 3051F HG A1C>EQUAL 7.0%<8.0%: CPT | Performed by: FAMILY MEDICINE

## 2024-06-04 PROCEDURE — 82962 GLUCOSE BLOOD TEST: CPT | Performed by: FAMILY MEDICINE

## 2024-06-04 PROCEDURE — 83036 HEMOGLOBIN GLYCOSYLATED A1C: CPT | Mod: CLIA WAIVED TEST | Performed by: FAMILY MEDICINE

## 2024-06-04 PROCEDURE — 3079F DIAST BP 80-89 MM HG: CPT | Performed by: FAMILY MEDICINE

## 2024-06-04 PROCEDURE — 1036F TOBACCO NON-USER: CPT | Performed by: FAMILY MEDICINE

## 2024-06-04 PROCEDURE — 3074F SYST BP LT 130 MM HG: CPT | Performed by: FAMILY MEDICINE

## 2024-06-04 PROCEDURE — 3008F BODY MASS INDEX DOCD: CPT | Performed by: FAMILY MEDICINE

## 2024-06-04 PROCEDURE — 99214 OFFICE O/P EST MOD 30 MIN: CPT | Performed by: FAMILY MEDICINE

## 2024-06-04 RX ORDER — TIRZEPATIDE 5 MG/.5ML
5 INJECTION, SOLUTION SUBCUTANEOUS
Qty: 0.5 ML | Refills: 3 | Status: SHIPPED | OUTPATIENT
Start: 2024-06-09 | End: 2024-06-05 | Stop reason: SDUPTHER

## 2024-06-04 NOTE — PATIENT INSTRUCTIONS
I wrote a prescription and sent it to [facility] for Mounjaro which is equal to Zepp bound    But please try to walk at least a half a mile every day and eat at least 1 cup of leafy greens every day    Along with 3 gummy Metamucil with 8 ounces of water with this combination it might help you to lose weight but have to be consistent        Sleep apnea will be history so you do not have to be bothered with CPAP machine but until such time you have to see the sleep specialist and talk about the inspire      Please go for the mammogram      3 months

## 2024-06-04 NOTE — PROGRESS NOTES
She is here for follow-up on morbid obesity hypertension allergy asthma    She says she has been talking to her outside company to take zebbound --instead I will prescribe Mounjaro since she has sleep apnea diabetes morbid obesity she should qualify under insurance    Continue with metoprolol losartan triamterene for her hypertension    Advised her to see the sleep specialist to talk further about fletcher    Reminded patient to get the mammogram done    Advised patient to come back in 3month

## 2024-06-05 DIAGNOSIS — E11.69 TYPE 2 DIABETES MELLITUS WITH OTHER SPECIFIED COMPLICATION, UNSPECIFIED WHETHER LONG TERM INSULIN USE (MULTI): ICD-10-CM

## 2024-06-05 RX ORDER — TIRZEPATIDE 5 MG/.5ML
5 INJECTION, SOLUTION SUBCUTANEOUS
Qty: 0.5 ML | Refills: 3 | Status: SHIPPED | OUTPATIENT
Start: 2024-06-09

## 2024-06-13 ENCOUNTER — APPOINTMENT (OUTPATIENT)
Dept: RADIOLOGY | Facility: CLINIC | Age: 49
End: 2024-06-13
Payer: COMMERCIAL

## 2024-06-14 ENCOUNTER — HOSPITAL ENCOUNTER (OUTPATIENT)
Dept: RADIOLOGY | Facility: CLINIC | Age: 49
Discharge: HOME | End: 2024-06-14
Payer: COMMERCIAL

## 2024-06-14 VITALS — WEIGHT: 279.98 LBS | BODY MASS INDEX: 47.8 KG/M2 | HEIGHT: 64 IN

## 2024-06-14 DIAGNOSIS — Z12.39 ENCOUNTER FOR SCREENING BREAST EXAMINATION: ICD-10-CM

## 2024-06-14 PROCEDURE — 77067 SCR MAMMO BI INCL CAD: CPT

## 2024-06-17 ENCOUNTER — OFFICE VISIT (OUTPATIENT)
Dept: SLEEP MEDICINE | Facility: CLINIC | Age: 49
End: 2024-06-17
Payer: COMMERCIAL

## 2024-06-17 VITALS
HEART RATE: 70 BPM | WEIGHT: 282.1 LBS | BODY MASS INDEX: 48.16 KG/M2 | TEMPERATURE: 97.3 F | DIASTOLIC BLOOD PRESSURE: 92 MMHG | SYSTOLIC BLOOD PRESSURE: 139 MMHG | HEIGHT: 64 IN

## 2024-06-17 DIAGNOSIS — I10 PRIMARY HYPERTENSION: Primary | ICD-10-CM

## 2024-06-17 DIAGNOSIS — G47.33 OSA (OBSTRUCTIVE SLEEP APNEA): ICD-10-CM

## 2024-06-17 PROCEDURE — 3051F HG A1C>EQUAL 7.0%<8.0%: CPT | Performed by: STUDENT IN AN ORGANIZED HEALTH CARE EDUCATION/TRAINING PROGRAM

## 2024-06-17 PROCEDURE — 4010F ACE/ARB THERAPY RXD/TAKEN: CPT | Performed by: STUDENT IN AN ORGANIZED HEALTH CARE EDUCATION/TRAINING PROGRAM

## 2024-06-17 PROCEDURE — 3075F SYST BP GE 130 - 139MM HG: CPT | Performed by: STUDENT IN AN ORGANIZED HEALTH CARE EDUCATION/TRAINING PROGRAM

## 2024-06-17 PROCEDURE — 99204 OFFICE O/P NEW MOD 45 MIN: CPT | Performed by: STUDENT IN AN ORGANIZED HEALTH CARE EDUCATION/TRAINING PROGRAM

## 2024-06-17 PROCEDURE — G2211 COMPLEX E/M VISIT ADD ON: HCPCS | Performed by: STUDENT IN AN ORGANIZED HEALTH CARE EDUCATION/TRAINING PROGRAM

## 2024-06-17 PROCEDURE — 3080F DIAST BP >= 90 MM HG: CPT | Performed by: STUDENT IN AN ORGANIZED HEALTH CARE EDUCATION/TRAINING PROGRAM

## 2024-06-17 PROCEDURE — 1036F TOBACCO NON-USER: CPT | Performed by: STUDENT IN AN ORGANIZED HEALTH CARE EDUCATION/TRAINING PROGRAM

## 2024-06-17 PROCEDURE — 3008F BODY MASS INDEX DOCD: CPT | Performed by: STUDENT IN AN ORGANIZED HEALTH CARE EDUCATION/TRAINING PROGRAM

## 2024-06-17 ASSESSMENT — SLEEP AND FATIGUE QUESTIONNAIRES
HOW LIKELY ARE YOU TO NOD OFF OR FALL ASLEEP WHILE SITTING QUIETLY AFTER LUNCH WITHOUT ALCOHOL: WOULD NEVER DOZE
WAKING_TOO_EARLY: MILD
HOW LIKELY ARE YOU TO NOD OFF OR FALL ASLEEP WHEN YOU ARE A PASSENGER IN A CAR FOR AN HOUR WITHOUT A BREAK: WOULD NEVER DOZE
SLEEP_PROBLEM_NOTICEABLE_TO_OTHERS: SOMEWHAT
HOW LIKELY ARE YOU TO NOD OFF OR FALL ASLEEP WHILE WATCHING TV: MODERATE CHANCE OF DOZING
SITING INACTIVE IN A PUBLIC PLACE LIKE A CLASS ROOM OR A MOVIE THEATER: WOULD NEVER DOZE
HOW LIKELY ARE YOU TO NOD OFF OR FALL ASLEEP WHILE SITTING AND READING: SLIGHT CHANCE OF DOZING
ESS-CHAD TOTAL SCORE: 4
HOW LIKELY ARE YOU TO NOD OFF OR FALL ASLEEP WHILE LYING DOWN TO REST IN THE AFTERNOON WHEN CIRCUMSTANCES PERMIT: SLIGHT CHANCE OF DOZING
SLEEP_PROBLEM_INTERFERES_DAILY_ACTIVITIES: A LITTLE
SATISFACTION_WITH_CURRENT_SLEEP_PATTERN: VERY SATISFIED
HOW LIKELY ARE YOU TO NOD OFF OR FALL ASLEEP IN A CAR, WHILE STOPPED FOR A FEW MINUTES IN TRAFFIC: WOULD NEVER DOZE
HOW LIKELY ARE YOU TO NOD OFF OR FALL ASLEEP WHILE SITTING AND TALKING TO SOMEONE: WOULD NEVER DOZE
WORRIED_DISTRESSED_DUE_TO_SLEEP: A LITTLE

## 2024-06-17 ASSESSMENT — ENCOUNTER SYMPTOMS
CARDIOVASCULAR NEGATIVE: 1
RESPIRATORY NEGATIVE: 1
NEUROLOGICAL NEGATIVE: 1
CONSTITUTIONAL NEGATIVE: 1
PSYCHIATRIC NEGATIVE: 1

## 2024-06-17 NOTE — ASSESSMENT & PLAN NOTE
Previously diagnosed with MAIDA, prescribed CPAP, but couldn't really tolerate it  Likely 2/2 inadequate education  Will bring back patient in a week for troubleshooting session

## 2024-06-17 NOTE — ASSESSMENT & PLAN NOTE
BMI Readings from Last 1 Encounters:   06/17/24 48.42 kg/m²     - encouraged healthy weight loss via diet and exercise  - consider referral to the weight loss program at follow-up visit

## 2024-06-17 NOTE — ASSESSMENT & PLAN NOTE
BP Readings from Last 1 Encounters:   06/17/24 (!) 139/92     - BP mildly elevated today but asymptomatic  - discussed at length the impact of untreated MAIDA and BP control  - continue current management and follow-up with PCP

## 2024-06-17 NOTE — PROGRESS NOTES
Patient: Ralph Saldaña    [id number]  : 1975 -- AGE 48 y.o.    Provider: Henri Alvarado MD     Location [facility]   Service Date: 2024              [facility]  New Visit Note      HISTORY OF PRESENT ILLNESS     The patient's referring provider is: Evangelina Floyd*; Evangelina Floyd MD    HISTORY OF PRESENT ILLNESS   Ralph Saldaña is a 48 y.o. female with h/o Hypertension, MAIDA, and Obesity who presents to a [facility] for a sleep medicine evaluation with concerns of Follow-up.     Past Sleep History  Patient has the following sleep-related diagnoses: obstructive sleep apnea. Prior sleep study results: significant for severe MAIDA with AHI ~ 37    Current History    On today's visit, the patient reports that she was diagnosed with MAIDA and prescribed CPAP but never got used to it.  There wasn't really a single night that she was able to use it for more than 5-6 hours.  She stopped using it but still has the machine.  Her PCP urged her to get this looked at.  She has history of HTN and is becoming harder to control.  She is working on weight loss but without much success    Sleep schedule:      Weekdays / Work Days Weekends / Days Off   Bedtime 11 pm  same as weekdays   Sleep latency 5 min same as weekdays   Wake time 6 am  same as weekdays   Total sleep time  Average/day:  Total sleep time 7 hours/day Same as weekdays   Naps No   Nocturnal awakenings Yes, about 2 times a night     Preferred sleeping position: SLEEP POSITION: sidelying    Sleep-related ROS:    Sleep Initiation: no problems going to sleep  Problems going to sleep associated with: No problems going to sleep    Sleep Maintenance: wakes up about 2 times per night and easily returns to sleep after awakening  Problems staying asleep associated with: Problems Staying Asleep: nocturia    Breathing during sleep: snoring    RLS screen:  RLSSCREEN: - Sensations:  Patient does not have unusual sensations in their extremities that cause an urge to move them     Daytime Symptoms  On awakening patient reports: morning headaches and morning dry mouth    Patient reports DAYTIME SYMPTOMS: no daytime symptoms  Patient denies daytime symptoms including: Denies: excessive daytime sleepiness  Fatigue: denies feeling fatigue    ESS: 4  KIKI: 5  FOSQ: 37      REVIEW OF SYSTEMS     REVIEW OF SYSTEMS  Review of Systems   Constitutional: Negative.    HENT: Negative.     Respiratory: Negative.     Cardiovascular: Negative.    Genitourinary: Negative.    Skin: Negative.    Neurological: Negative.    Psychiatric/Behavioral: Negative.       SLEEP ROS: snoring      ALLERGIES AND MEDICATIONS     ALLERGIES  Allergies   Allergen Reactions    Bupropion Hcl Hives    Penicillins Hives and Unknown    Shellfish Derived Unknown       MEDICATIONS  Current Outpatient Medications   Medication Sig Dispense Refill    escitalopram (Lexapro) 20 mg tablet TAKE 1 TABLET DAILY 90 tablet 3    losartan (Cozaar) 50 mg tablet Take 1 tablet (50 mg) by mouth once daily. 90 tablet 3    metoprolol succinate XL (Toprol-XL) 50 mg 24 hr tablet TAKE 1 TABLET DAILY 90 tablet 3    montelukast (Singulair) 10 mg tablet TAKE 1 TABLET DAILY 90 tablet 3    triamterene-hydrochlorothiazid (Maxzide-25) 37.5-25 mg tablet TAKE 1 TABLET DAILY 90 tablet 3     No current facility-administered medications for this visit.         PAST HISTORY     PAST MEDICAL HISTORY  She  has a past medical history of Body mass index (BMI) 45.0-49.9, adult (Multi) (12/03/2021), Encounter for general adult medical examination without abnormal findings (01/02/2020), Obstructive sleep apnea (adult) (pediatric) (01/28/2020), and Personal history of diseases of the skin and subcutaneous tissue (06/21/2022).    PAST SURGICAL HISTORY:  Past Surgical History:   Procedure Laterality Date    OTHER SURGICAL HISTORY  01/02/2020    Breast reduction       FAMILY  "HISTORY  No family history on file.    She does have a family history of sleep disorder.    SOCIAL HISTORY  She  reports that she has never smoked. She has never used smokeless tobacco. Alcohol use questions deferred to the physician. She reports that she does not use drugs.      Caffeine consumption: Yes, rarely (occasional)  Alcohol consumption: Yes, rarely (occasional)  Smoking: No  Marijuana: No      PHYSICAL EXAM     VITAL SIGNS: BP (!) 139/92 (BP Location: Right arm, Patient Position: Sitting, BP Cuff Size: Adult)   Pulse 70   Temp 36.3 °C (97.3 °F) (Temporal)   Ht 1.626 m (5' 4\")   Wt 128 kg (282 lb 1.6 oz)   BMI 48.42 kg/m²      CURRENT WEIGHT:   Vitals:    06/17/24 0941   Weight: 128 kg (282 lb 1.6 oz)     Body mass index is 48.42 kg/m².  PREVIOUS WEIGHTS:  Wt Readings from Last 3 Encounters:   06/17/24 128 kg (282 lb 1.6 oz)   06/14/24 127 kg (279 lb 15.8 oz)   06/04/24 127 kg (281 lb)       Physical Exam  Vitals reviewed.   Constitutional:       General: She is not in acute distress.     Appearance: Normal appearance. She is well-developed and normal weight.   HENT:      Head: Normocephalic and atraumatic.      Nose: Nose normal. No congestion or rhinorrhea.      Mouth/Throat:      Mouth: Mucous membranes are moist.      Pharynx: Oropharynx is clear. No oropharyngeal exudate.   Eyes:      General: No scleral icterus.     Extraocular Movements: Extraocular movements intact.      Conjunctiva/sclera: Conjunctivae normal.      Pupils: Pupils are equal, round, and reactive to light.   Neck:      Thyroid: No thyroid mass or thyroid tenderness.      Vascular: No JVD.   Cardiovascular:      Rate and Rhythm: Normal rate.      Pulses: Normal pulses.   Pulmonary:      Effort: Pulmonary effort is normal. No respiratory distress.   Musculoskeletal:      Cervical back: Normal range of motion and neck supple. No rigidity or tenderness.   Lymphadenopathy:      Cervical: No cervical adenopathy.   Neurological:      " Mental Status: She is alert and oriented to person, place, and time.   Psychiatric:         Mood and Affect: Mood normal.         Behavior: Behavior normal.         Thought Content: Thought content normal.       PHYSICAL EXAM: MODIFIED MALLAMPATI SCORE: III (soft and hard palate and base of uvula visible)  TONGUE SCALLOPING: with scalloping      RESULTS/DATA     Bicarbonate (mmol/L)   Date Value   12/19/2023 28   12/14/2022 28   12/03/2021 27   11/20/2020 27             ASSESSMENT/PLAN     Ms. Saldaña is a 48 y.o. female and She was referred to the [facility] for evaluation of MAIDA    Problem List, Orders, Assessment, Recommendations:  Problem List Items Addressed This Visit             ICD-10-CM    MAIDA (obstructive sleep apnea) G47.33     Previously diagnosed with MAIDA, prescribed CPAP, but couldn't really tolerate it  Likely 2/2 inadequate education  Will bring back patient in a week for troubleshooting session         Primary hypertension - Primary I10     BP Readings from Last 1 Encounters:   06/17/24 (!) 139/92     - BP mildly elevated today but asymptomatic  - discussed at length the impact of untreated MAIDA and BP control  - continue current management and follow-up with PCP            BMI 45.0-49.9, adult (Multi) Z68.42     BMI Readings from Last 1 Encounters:   06/17/24 48.42 kg/m²     - encouraged healthy weight loss via diet and exercise  - consider referral to the weight loss program at follow-up visit              Disposition    Return to clinic in 0.25 months

## 2024-06-17 NOTE — LETTER
2024     Evangelina Floyd MD  [address] [address] [address]    Patient: Ralph Saldaña   YOB: 1975   Date of Visit: 2024       Dear Dr. Evangelina Floyd MD:    Thank you for referring Ralph Saldaña to me for evaluation. Below are my notes for this consultation.  If you have questions, please do not hesitate to call me. I look forward to following your patient along with you.       Sincerely,     Henri Alvarado MD      CC: No Recipients  ______________________________________________________________________________________     Patient: Ralph Saldaña    [id number]  : 1975 -- AGE 48 y.o.    Provider: Henri Alvarado MD     Location [facility]   Service Date: 2024              [facility]  New Visit Note      HISTORY OF PRESENT ILLNESS     The patient's referring provider is: Evangelina Floyd*; Evangelina Floyd MD    HISTORY OF PRESENT ILLNESS   Ralph Saldaña is a 48 y.o. female with h/o Hypertension, MAIDA, and Obesity who presents to a [facility] for a sleep medicine evaluation with concerns of Follow-up.     Past Sleep History  Patient has the following sleep-related diagnoses: obstructive sleep apnea. Prior sleep study results: significant for severe MAIDA with AHI ~ 37    Current History    On today's visit, the patient reports that she was diagnosed with MAIDA and prescribed CPAP but never got used to it.  There wasn't really a single night that she was able to use it for more than 5-6 hours.  She stopped using it but still has the machine.  Her PCP urged her to get this looked at.  She has history of HTN and is becoming harder to control.  She is working on weight loss but without much success    Sleep schedule:      Weekdays / Work Days Weekends / Days Off   Bedtime 11 pm  same as weekdays   Sleep latency 5 min same as weekdays   Wake time 6 am  same as  weekdays   Total sleep time  Average/day:  Total sleep time 7 hours/day Same as weekdays   Naps No   Nocturnal awakenings Yes, about 2 times a night     Preferred sleeping position: SLEEP POSITION: sidelying    Sleep-related ROS:    Sleep Initiation: no problems going to sleep  Problems going to sleep associated with: No problems going to sleep    Sleep Maintenance: wakes up about 2 times per night and easily returns to sleep after awakening  Problems staying asleep associated with: Problems Staying Asleep: nocturia    Breathing during sleep: snoring    RLS screen:  RLSSCREEN: - Sensations: Patient does not have unusual sensations in their extremities that cause an urge to move them     Daytime Symptoms  On awakening patient reports: morning headaches and morning dry mouth    Patient reports DAYTIME SYMPTOMS: no daytime symptoms  Patient denies daytime symptoms including: Denies: excessive daytime sleepiness  Fatigue: denies feeling fatigue    ESS: 4  KIKI: 5  FOSQ: 37      REVIEW OF SYSTEMS     REVIEW OF SYSTEMS  Review of Systems   Constitutional: Negative.    HENT: Negative.     Respiratory: Negative.     Cardiovascular: Negative.    Genitourinary: Negative.    Skin: Negative.    Neurological: Negative.    Psychiatric/Behavioral: Negative.       SLEEP ROS: snoring      ALLERGIES AND MEDICATIONS     ALLERGIES  Allergies   Allergen Reactions   • Bupropion Hcl Hives   • Penicillins Hives and Unknown   • Shellfish Derived Unknown       MEDICATIONS  Current Outpatient Medications   Medication Sig Dispense Refill   • escitalopram (Lexapro) 20 mg tablet TAKE 1 TABLET DAILY 90 tablet 3   • losartan (Cozaar) 50 mg tablet Take 1 tablet (50 mg) by mouth once daily. 90 tablet 3   • metoprolol succinate XL (Toprol-XL) 50 mg 24 hr tablet TAKE 1 TABLET DAILY 90 tablet 3   • montelukast (Singulair) 10 mg tablet TAKE 1 TABLET DAILY 90 tablet 3   • triamterene-hydrochlorothiazid (Maxzide-25) 37.5-25 mg tablet TAKE 1 TABLET DAILY 90  "tablet 3     No current facility-administered medications for this visit.         PAST HISTORY     PAST MEDICAL HISTORY  She  has a past medical history of Body mass index (BMI) 45.0-49.9, adult (Multi) (12/03/2021), Encounter for general adult medical examination without abnormal findings (01/02/2020), Obstructive sleep apnea (adult) (pediatric) (01/28/2020), and Personal history of diseases of the skin and subcutaneous tissue (06/21/2022).    PAST SURGICAL HISTORY:  Past Surgical History:   Procedure Laterality Date   • OTHER SURGICAL HISTORY  01/02/2020    Breast reduction       FAMILY HISTORY  No family history on file.    She does have a family history of sleep disorder.    SOCIAL HISTORY  She  reports that she has never smoked. She has never used smokeless tobacco. Alcohol use questions deferred to the physician. She reports that she does not use drugs.      Caffeine consumption: Yes, rarely (occasional)  Alcohol consumption: Yes, rarely (occasional)  Smoking: No  Marijuana: No      PHYSICAL EXAM     VITAL SIGNS: BP (!) 139/92 (BP Location: Right arm, Patient Position: Sitting, BP Cuff Size: Adult)   Pulse 70   Temp 36.3 °C (97.3 °F) (Temporal)   Ht 1.626 m (5' 4\")   Wt 128 kg (282 lb 1.6 oz)   BMI 48.42 kg/m²      CURRENT WEIGHT:   Vitals:    06/17/24 0941   Weight: 128 kg (282 lb 1.6 oz)     Body mass index is 48.42 kg/m².  PREVIOUS WEIGHTS:  Wt Readings from Last 3 Encounters:   06/17/24 128 kg (282 lb 1.6 oz)   06/14/24 127 kg (279 lb 15.8 oz)   06/04/24 127 kg (281 lb)       Physical Exam  Vitals reviewed.   Constitutional:       General: She is not in acute distress.     Appearance: Normal appearance. She is well-developed and normal weight.   HENT:      Head: Normocephalic and atraumatic.      Nose: Nose normal. No congestion or rhinorrhea.      Mouth/Throat:      Mouth: Mucous membranes are moist.      Pharynx: Oropharynx is clear. No oropharyngeal exudate.   Eyes:      General: No scleral " icterus.     Extraocular Movements: Extraocular movements intact.      Conjunctiva/sclera: Conjunctivae normal.      Pupils: Pupils are equal, round, and reactive to light.   Neck:      Thyroid: No thyroid mass or thyroid tenderness.      Vascular: No JVD.   Cardiovascular:      Rate and Rhythm: Normal rate.      Pulses: Normal pulses.   Pulmonary:      Effort: Pulmonary effort is normal. No respiratory distress.   Musculoskeletal:      Cervical back: Normal range of motion and neck supple. No rigidity or tenderness.   Lymphadenopathy:      Cervical: No cervical adenopathy.   Neurological:      Mental Status: She is alert and oriented to person, place, and time.   Psychiatric:         Mood and Affect: Mood normal.         Behavior: Behavior normal.         Thought Content: Thought content normal.       PHYSICAL EXAM: MODIFIED MALLAMPATI SCORE: III (soft and hard palate and base of uvula visible)  TONGUE SCALLOPING: with scalloping      RESULTS/DATA     Bicarbonate (mmol/L)   Date Value   12/19/2023 28   12/14/2022 28   12/03/2021 27   11/20/2020 27             ASSESSMENT/PLAN     Ms. Saldaña is a 48 y.o. female and She was referred to the [facility] for evaluation of MAIDA    Problem List, Orders, Assessment, Recommendations:  Problem List Items Addressed This Visit             ICD-10-CM    MAIDA (obstructive sleep apnea) G47.33     Previously diagnosed with MAIDA, prescribed CPAP, but couldn't really tolerate it  Likely 2/2 inadequate education  Will bring back patient in a week for troubleshooting session         Primary hypertension - Primary I10     BP Readings from Last 1 Encounters:   06/17/24 (!) 139/92     - BP mildly elevated today but asymptomatic  - discussed at length the impact of untreated MAIDA and BP control  - continue current management and follow-up with PCP            BMI 45.0-49.9, adult (Multi) Z68.42     BMI Readings from Last 1 Encounters:   06/17/24 48.42 kg/m²     -  encouraged healthy weight loss via diet and exercise  - consider referral to the weight loss program at follow-up visit              Disposition    Return to clinic in 0.25 months

## 2024-06-24 ENCOUNTER — APPOINTMENT (OUTPATIENT)
Dept: SLEEP MEDICINE | Facility: CLINIC | Age: 49
End: 2024-06-24
Payer: COMMERCIAL

## 2024-06-24 VITALS
DIASTOLIC BLOOD PRESSURE: 78 MMHG | HEART RATE: 63 BPM | WEIGHT: 280.4 LBS | HEIGHT: 64 IN | SYSTOLIC BLOOD PRESSURE: 117 MMHG | BODY MASS INDEX: 47.87 KG/M2 | TEMPERATURE: 97.3 F

## 2024-06-24 DIAGNOSIS — G47.33 OSA (OBSTRUCTIVE SLEEP APNEA): Primary | ICD-10-CM

## 2024-06-24 DIAGNOSIS — I10 PRIMARY HYPERTENSION: ICD-10-CM

## 2024-06-24 PROCEDURE — 99214 OFFICE O/P EST MOD 30 MIN: CPT | Performed by: STUDENT IN AN ORGANIZED HEALTH CARE EDUCATION/TRAINING PROGRAM

## 2024-06-24 PROCEDURE — 3078F DIAST BP <80 MM HG: CPT | Performed by: STUDENT IN AN ORGANIZED HEALTH CARE EDUCATION/TRAINING PROGRAM

## 2024-06-24 PROCEDURE — G2211 COMPLEX E/M VISIT ADD ON: HCPCS | Performed by: STUDENT IN AN ORGANIZED HEALTH CARE EDUCATION/TRAINING PROGRAM

## 2024-06-24 PROCEDURE — 3074F SYST BP LT 130 MM HG: CPT | Performed by: STUDENT IN AN ORGANIZED HEALTH CARE EDUCATION/TRAINING PROGRAM

## 2024-06-24 PROCEDURE — 3051F HG A1C>EQUAL 7.0%<8.0%: CPT | Performed by: STUDENT IN AN ORGANIZED HEALTH CARE EDUCATION/TRAINING PROGRAM

## 2024-06-24 PROCEDURE — 4010F ACE/ARB THERAPY RXD/TAKEN: CPT | Performed by: STUDENT IN AN ORGANIZED HEALTH CARE EDUCATION/TRAINING PROGRAM

## 2024-06-24 PROCEDURE — 1036F TOBACCO NON-USER: CPT | Performed by: STUDENT IN AN ORGANIZED HEALTH CARE EDUCATION/TRAINING PROGRAM

## 2024-06-24 PROCEDURE — 3008F BODY MASS INDEX DOCD: CPT | Performed by: STUDENT IN AN ORGANIZED HEALTH CARE EDUCATION/TRAINING PROGRAM

## 2024-06-24 ASSESSMENT — ENCOUNTER SYMPTOMS
RESPIRATORY NEGATIVE: 1
NEUROLOGICAL NEGATIVE: 1
CONSTITUTIONAL NEGATIVE: 1
PSYCHIATRIC NEGATIVE: 1
CARDIOVASCULAR NEGATIVE: 1

## 2024-06-24 ASSESSMENT — SLEEP AND FATIGUE QUESTIONNAIRES
HOW LIKELY ARE YOU TO NOD OFF OR FALL ASLEEP WHILE WATCHING TV: MODERATE CHANCE OF DOZING
DIFFICULTY_STAYING_ASLEEP: MODERATE
HOW LIKELY ARE YOU TO NOD OFF OR FALL ASLEEP WHILE LYING DOWN TO REST IN THE AFTERNOON WHEN CIRCUMSTANCES PERMIT: MODERATE CHANCE OF DOZING
ESS-CHAD TOTAL SCORE: 5
WORRIED_DISTRESSED_DUE_TO_SLEEP: A LITTLE
WAKING_TOO_EARLY: MILD
SLEEP_PROBLEM_NOTICEABLE_TO_OTHERS: SOMEWHAT
HOW LIKELY ARE YOU TO NOD OFF OR FALL ASLEEP WHILE SITTING AND READING: SLIGHT CHANCE OF DOZING
SITING INACTIVE IN A PUBLIC PLACE LIKE A CLASS ROOM OR A MOVIE THEATER: WOULD NEVER DOZE
SLEEP_PROBLEM_INTERFERES_DAILY_ACTIVITIES: NOT AT ALL NOTICEABLE
HOW LIKELY ARE YOU TO NOD OFF OR FALL ASLEEP WHILE SITTING AND TALKING TO SOMEONE: WOULD NEVER DOZE
DIFFICULTY_FALLING_ASLEEP: MODERATE
HOW LIKELY ARE YOU TO NOD OFF OR FALL ASLEEP WHILE SITTING QUIETLY AFTER LUNCH WITHOUT ALCOHOL: WOULD NEVER DOZE
HOW LIKELY ARE YOU TO NOD OFF OR FALL ASLEEP IN A CAR, WHILE STOPPED FOR A FEW MINUTES IN TRAFFIC: WOULD NEVER DOZE
HOW LIKELY ARE YOU TO NOD OFF OR FALL ASLEEP WHEN YOU ARE A PASSENGER IN A CAR FOR AN HOUR WITHOUT A BREAK: WOULD NEVER DOZE
SATISFACTION_WITH_CURRENT_SLEEP_PATTERN: SATISFIED

## 2024-06-24 NOTE — ASSESSMENT & PLAN NOTE
BP Readings from Last 1 Encounters:   06/24/24 117/78     - BP mildly elevated today but asymptomatic  - discussed at length the impact of untreated MAIDA and BP control  - continue current management and follow-up with PCP

## 2024-06-24 NOTE — ASSESSMENT & PLAN NOTE
Previously diagnosed with MAIDA, prescribed CPAP, but couldn't really tolerate it  Likely 2/2 inadequate education    She did very well with troubleshooting session and was fitted to N30i    Acclimation education given and patient is excited to try

## 2024-06-24 NOTE — PROGRESS NOTES
Patient: Ralph Saldaña    [id number]  : 1975 -- AGE 48 y.o.    Provider: Henri Alvarado MD     Location [facility]   Service Date: 2024              [facility]  Followup Visit Note    HISTORY OF PRESENT ILLNESS     HISTORY OF PRESENT ILLNESS   Ralph Saldaña is a 48 y.o. female with h/o Hypertension, MAIDA, and Obesity who presents to a [facility] for followup.     Assessment and plan from last visit: 2024    Ms. Saldaña is a 48 y.o. female and She was referred to the [facility] for evaluation of MAIDA     Problem List, Orders, Assessment, Recommendations:  Problem List Items Addressed This Visit               ICD-10-CM     MAIDA (obstructive sleep apnea) G47.33       Previously diagnosed with MAIDA, prescribed CPAP, but couldn't really tolerate it  Likely 2/2 inadequate education  Will bring back patient in a week for troubleshooting session           Primary hypertension - Primary I10           BP Readings from Last 1 Encounters:   24 (!) 139/92      - BP mildly elevated today but asymptomatic  - discussed at length the impact of untreated MAIDA and BP control  - continue current management and follow-up with PCP               BMI 45.0-49.9, adult (Multi) Z68.42           BMI Readings from Last 1 Encounters:   24 48.42 kg/m²      - encouraged healthy weight loss via diet and exercise  - consider referral to the weight loss program at follow-up visit                  Disposition     Return to clinic in 0.25 months       Current History    On today's visit, the patient reports that she is ready for troubleshooting session today.  She also had some questions regarding [employer].    PAP Info  [email or web address]: [employer]  Machine: THERAPY: [employer] DREAMSTATION  6 cm H2O - 14 cm H2O Mask: MASK TYPE: RESMED  N30i SMALL WIDE    RLS Followup:    none.    Daytime Symptoms    Patient reports DAYTIME SYMPTOMS: no daytime symptoms  Patient denies daytime symptoms including: Denies: excessive daytime sleepiness    Naps: No  Fatigue: denies feeling fatigue    ESS: 5  KIKI: 9  FOSQ: 38    REVIEW OF SYSTEMS     REVIEW OF SYSTEMS  Review of Systems   Constitutional: Negative.    HENT: Negative.     Respiratory: Negative.     Cardiovascular: Negative.    Genitourinary: Negative.    Skin: Negative.    Neurological: Negative.    Psychiatric/Behavioral: Negative.           ALLERGIES AND MEDICATIONS     ALLERGIES  Allergies   Allergen Reactions    Bupropion Hcl Hives    Penicillins Hives and Unknown    Shellfish Derived Unknown       MEDICATIONS: She has a current medication list which includes the following prescription(s): escitalopram - TAKE 1 TABLET DAILY, losartan - Take 1 tablet (50 mg) by mouth once daily, metoprolol succinate xl - TAKE 1 TABLET DAILY, montelukast - TAKE 1 TABLET DAILY, triamterene-hydrochlorothiazid - TAKE 1 TABLET DAILY, and [DISCONTINUED] semaglutide - TEST.    PAST MEDICAL HISTORY : She  has a past medical history of Body mass index (BMI) 45.0-49.9, adult (Multi) (12/03/2021), Encounter for general adult medical examination without abnormal findings (01/02/2020), Obstructive sleep apnea (adult) (pediatric) (01/28/2020), and Personal history of diseases of the skin and subcutaneous tissue (06/21/2022).    PAST SURGICAL HISTORY: She  has a past surgical history that includes Other surgical history (01/02/2020).     FAMILY HISTORY: No changes since previous visit. Otherwise non-contributory as charted.     SOCIAL HISTORY  She  reports that she has never smoked. She has never used smokeless tobacco. Alcohol use questions deferred to the physician. She reports that she does not use drugs.       PHYSICAL EXAM     VITAL SIGNS: /78 (BP Location: Right arm, Patient Position: Sitting, BP Cuff Size: Adult)   Pulse 63   Temp 36.3 °C (97.3 °F)  "(Temporal)   Ht 1.626 m (5' 4\")   Wt 127 kg (280 lb 6.4 oz)   BMI 48.13 kg/m²      PREVIOUS WEIGHTS:  Wt Readings from Last 3 Encounters:   06/24/24 127 kg (280 lb 6.4 oz)   06/17/24 128 kg (282 lb 1.6 oz)   06/14/24 127 kg (279 lb 15.8 oz)         RESULTS/DATA     Bicarbonate (mmol/L)   Date Value   12/19/2023 28   12/14/2022 28   12/03/2021 27   11/20/2020 27       PAP Adherence  Not applicable    ASSESSMENT/PLAN     Ms. Saldaña is a 48 y.o. female and she returns in followup to the [facility] for MAIDA.    Problem List, Orders, Assessment, Recommendations:  Problem List Items Addressed This Visit             ICD-10-CM    MAIDA (obstructive sleep apnea) - Primary G47.33     Previously diagnosed with MAIDA, prescribed CPAP, but couldn't really tolerate it  Likely 2/2 inadequate education    She did very well with troubleshooting session and was fitted to N30i    Acclimation education given and patient is excited to try         Relevant Orders    Positive Airway Pressure (PAP) Therapy    Primary hypertension I10     BP Readings from Last 1 Encounters:   06/24/24 117/78     - BP mildly elevated today but asymptomatic  - discussed at length the impact of untreated MAIDA and BP control  - continue current management and follow-up with PCP            BMI 45.0-49.9, adult (Multi) Z68.42     BMI Readings from Last 1 Encounters:   06/24/24 48.13 kg/m²     - encouraged healthy weight loss via diet and exercise  - consider referral to the weight loss program at follow-up visit              Disposition    Return to clinic in 3-4 months           "

## 2024-06-24 NOTE — ASSESSMENT & PLAN NOTE
BMI Readings from Last 1 Encounters:   06/24/24 48.13 kg/m²     - encouraged healthy weight loss via diet and exercise  - consider referral to the weight loss program at follow-up visit

## 2024-07-15 DIAGNOSIS — I10 HYPERTENSION, UNSPECIFIED TYPE: ICD-10-CM

## 2024-07-16 RX ORDER — LOSARTAN POTASSIUM 50 MG/1
50 TABLET ORAL DAILY
Qty: 90 TABLET | Refills: 3 | Status: SHIPPED | OUTPATIENT
Start: 2024-07-16

## 2024-08-26 ENCOUNTER — APPOINTMENT (OUTPATIENT)
Dept: SLEEP MEDICINE | Facility: CLINIC | Age: 49
End: 2024-08-26
Payer: COMMERCIAL

## 2024-09-24 ENCOUNTER — APPOINTMENT (OUTPATIENT)
Dept: PRIMARY CARE | Facility: CLINIC | Age: 49
End: 2024-09-24
Payer: COMMERCIAL

## 2024-09-24 VITALS
HEART RATE: 80 BPM | BODY MASS INDEX: 44.18 KG/M2 | HEIGHT: 64 IN | TEMPERATURE: 96.2 F | SYSTOLIC BLOOD PRESSURE: 122 MMHG | DIASTOLIC BLOOD PRESSURE: 83 MMHG | WEIGHT: 258.8 LBS

## 2024-09-24 DIAGNOSIS — Z12.39 ENCOUNTER FOR SCREENING BREAST EXAMINATION: ICD-10-CM

## 2024-09-24 DIAGNOSIS — Z00.00 HEALTHCARE MAINTENANCE: ICD-10-CM

## 2024-09-24 DIAGNOSIS — Z01.419 ENCOUNTER FOR GYNECOLOGICAL EXAMINATION WITHOUT ABNORMAL FINDING: Primary | ICD-10-CM

## 2024-09-24 LAB — HBA1C MFR BLD: 5.9 % (ref 4.2–6.5)

## 2024-09-24 PROCEDURE — 99214 OFFICE O/P EST MOD 30 MIN: CPT | Performed by: FAMILY MEDICINE

## 2024-09-24 PROCEDURE — 3044F HG A1C LEVEL LT 7.0%: CPT | Performed by: FAMILY MEDICINE

## 2024-09-24 PROCEDURE — 90673 RIV3 VACCINE NO PRESERV IM: CPT | Performed by: FAMILY MEDICINE

## 2024-09-24 PROCEDURE — 3074F SYST BP LT 130 MM HG: CPT | Performed by: FAMILY MEDICINE

## 2024-09-24 PROCEDURE — 4010F ACE/ARB THERAPY RXD/TAKEN: CPT | Performed by: FAMILY MEDICINE

## 2024-09-24 PROCEDURE — 3008F BODY MASS INDEX DOCD: CPT | Performed by: FAMILY MEDICINE

## 2024-09-24 PROCEDURE — 90471 IMMUNIZATION ADMIN: CPT | Performed by: FAMILY MEDICINE

## 2024-09-24 PROCEDURE — 3079F DIAST BP 80-89 MM HG: CPT | Performed by: FAMILY MEDICINE

## 2024-09-24 PROCEDURE — 1036F TOBACCO NON-USER: CPT | Performed by: FAMILY MEDICINE

## 2024-09-24 PROCEDURE — 83036 HEMOGLOBIN GLYCOSYLATED A1C: CPT | Mod: CLIA WAIVED TEST | Performed by: FAMILY MEDICINE

## 2024-09-24 NOTE — PATIENT INSTRUCTIONS
Please take 8oz of water with your metamucil gummies.    Please add strength training to your weekly routine. 2 times per week, 20 reps on the big joints/muscles

## 2024-09-24 NOTE — PROGRESS NOTES
Patient is here for a follow up appointment. She is currently taking zeppound which is being prescribed by a service. We spoke about transferring itso that her PCP is prescribing it under the condition that she is accountable for her weekly calorie expendatutre. We spoke about her goals to get off the medication after 1 year of use and how that would look without any changes in her habits or behavior. She the MAIDA  June said follow up in 1 week. Canceled August appointment. October appointment?  Cologuard 2023  Mammogram due 12/2024  Pap 2025

## 2024-10-25 ENCOUNTER — APPOINTMENT (OUTPATIENT)
Dept: SLEEP MEDICINE | Facility: CLINIC | Age: 49
End: 2024-10-25
Payer: COMMERCIAL

## 2024-10-25 VITALS
DIASTOLIC BLOOD PRESSURE: 82 MMHG | HEIGHT: 64 IN | BODY MASS INDEX: 44.08 KG/M2 | HEART RATE: 84 BPM | SYSTOLIC BLOOD PRESSURE: 123 MMHG | WEIGHT: 258.2 LBS

## 2024-10-25 DIAGNOSIS — G47.23 IRREGULAR SLEEP-WAKE RHYTHM: ICD-10-CM

## 2024-10-25 DIAGNOSIS — I10 PRIMARY HYPERTENSION: ICD-10-CM

## 2024-10-25 DIAGNOSIS — G47.33 OSA (OBSTRUCTIVE SLEEP APNEA): Primary | ICD-10-CM

## 2024-10-25 PROCEDURE — 4010F ACE/ARB THERAPY RXD/TAKEN: CPT | Performed by: STUDENT IN AN ORGANIZED HEALTH CARE EDUCATION/TRAINING PROGRAM

## 2024-10-25 PROCEDURE — 99214 OFFICE O/P EST MOD 30 MIN: CPT | Performed by: STUDENT IN AN ORGANIZED HEALTH CARE EDUCATION/TRAINING PROGRAM

## 2024-10-25 PROCEDURE — 1036F TOBACCO NON-USER: CPT | Performed by: STUDENT IN AN ORGANIZED HEALTH CARE EDUCATION/TRAINING PROGRAM

## 2024-10-25 PROCEDURE — 3074F SYST BP LT 130 MM HG: CPT | Performed by: STUDENT IN AN ORGANIZED HEALTH CARE EDUCATION/TRAINING PROGRAM

## 2024-10-25 PROCEDURE — 3079F DIAST BP 80-89 MM HG: CPT | Performed by: STUDENT IN AN ORGANIZED HEALTH CARE EDUCATION/TRAINING PROGRAM

## 2024-10-25 PROCEDURE — 3008F BODY MASS INDEX DOCD: CPT | Performed by: STUDENT IN AN ORGANIZED HEALTH CARE EDUCATION/TRAINING PROGRAM

## 2024-10-25 PROCEDURE — 3044F HG A1C LEVEL LT 7.0%: CPT | Performed by: STUDENT IN AN ORGANIZED HEALTH CARE EDUCATION/TRAINING PROGRAM

## 2024-10-25 ASSESSMENT — ENCOUNTER SYMPTOMS
RESPIRATORY NEGATIVE: 1
PSYCHIATRIC NEGATIVE: 1
CARDIOVASCULAR NEGATIVE: 1
NEUROLOGICAL NEGATIVE: 1
CONSTITUTIONAL NEGATIVE: 1

## 2024-10-25 ASSESSMENT — SLEEP AND FATIGUE QUESTIONNAIRES
SITING INACTIVE IN A PUBLIC PLACE LIKE A CLASS ROOM OR A MOVIE THEATER: WOULD NEVER DOZE
ESS-CHAD TOTAL SCORE: 4
WORRIED_DISTRESSED_DUE_TO_SLEEP: NOT AT ALL NOTICEABLE
WAKING_TOO_EARLY: MILD
SATISFACTION_WITH_CURRENT_SLEEP_PATTERN: VERY SATISFIED
HOW LIKELY ARE YOU TO NOD OFF OR FALL ASLEEP WHILE LYING DOWN TO REST IN THE AFTERNOON WHEN CIRCUMSTANCES PERMIT: SLIGHT CHANCE OF DOZING
SLEEP_PROBLEM_NOTICEABLE_TO_OTHERS: SOMEWHAT
HOW LIKELY ARE YOU TO NOD OFF OR FALL ASLEEP WHILE WATCHING TV: MODERATE CHANCE OF DOZING
DIFFICULTY_STAYING_ASLEEP: MILD
HOW LIKELY ARE YOU TO NOD OFF OR FALL ASLEEP WHILE SITTING AND READING: SLIGHT CHANCE OF DOZING
HOW LIKELY ARE YOU TO NOD OFF OR FALL ASLEEP IN A CAR, WHILE STOPPED FOR A FEW MINUTES IN TRAFFIC: WOULD NEVER DOZE
HOW LIKELY ARE YOU TO NOD OFF OR FALL ASLEEP WHEN YOU ARE A PASSENGER IN A CAR FOR AN HOUR WITHOUT A BREAK: WOULD NEVER DOZE
HOW LIKELY ARE YOU TO NOD OFF OR FALL ASLEEP WHILE SITTING AND TALKING TO SOMEONE: WOULD NEVER DOZE
HOW LIKELY ARE YOU TO NOD OFF OR FALL ASLEEP WHILE SITTING QUIETLY AFTER LUNCH WITHOUT ALCOHOL: WOULD NEVER DOZE
SLEEP_PROBLEM_INTERFERES_DAILY_ACTIVITIES: A LITTLE

## 2024-10-25 NOTE — PROGRESS NOTES
Patient: Ralph Saldaña    [id number]  : 1975 -- AGE 49 y.o.    Provider: Henri Alvarado MD     Location [facility]   Service Date: 10/25/2024              [facility]  Followup Visit Note    ASSESSMENT/PLAN     Ms. Saldaña is a 49 y.o. female and she returns in followup to the [facility] for the problems listed below on 10/25/24     Problem List, Orders, Assessment, Recommendations:  Problem List Items Addressed This Visit             ICD-10-CM    MAIDA (obstructive sleep apnea) - Primary G47.33     Encouraged usage extension  Had some issues with [facility] with DME transfer, will look into and make sure that she is established with DME         Relevant Orders    Follow Up In Adult Sleep Medicine    Primary hypertension I10     BP Readings from Last 1 Encounters:   10/25/24 123/82     - doing well, asymptomatic  - discussed at length the impact of untreated MAIDA and BP control  - continue current management and follow-up with PCP           BMI 40.0-44.9, adult (Multi) Z68.41     BMI Readings from Last 1 Encounters:   10/25/24 44.32 kg/m²     - encouraged healthy weight loss via diet and exercise  - consider referral to the weight loss program at follow-up visit           Irregular sleep-wake rhythm G47.23     Patient doesn't really have a regular schedule as she often falls asleep on her couch watching TV.  She is afraid to go up to bed too early (as she usually just falls asleep) and wasting her night away.  However, after lengthy discussion, we agreed that we need to break the cycle somewhere.  She agrees that she should start establishing better daily routines and start going to bed earlier and put her PAP machine on    She is also to find a hobby to do and report back next visit            Disposition    Return to clinic in 4 months         HISTORY OF PRESENT ILLNESS     HISTORY OF PRESENT ILLNESS   Ralph Saldaña is a 49 y.o. female with  h/o Hypertension, MAIDA, and Obesity who presents to a [facility] for followup.     Assessment and plan from last visit: 5/24/2024    Ms. Saldaña is a 48 y.o. female and she returns in followup to the [facility] for MAIDA.     Problem List, Orders, Assessment, Recommendations:  Problem List Items Addressed This Visit               ICD-10-CM     MAIDA (obstructive sleep apnea) - Primary G47.33       Previously diagnosed with MAIDA, prescribed CPAP, but couldn't really tolerate it  Likely 2/2 inadequate education     She did very well with troubleshooting session and was fitted to N30i     Acclimation education given and patient is excited to try           Relevant Orders     Positive Airway Pressure (PAP) Therapy     Primary hypertension I10           BP Readings from Last 1 Encounters:   06/24/24 117/78      - BP mildly elevated today but asymptomatic  - discussed at length the impact of untreated MAIDA and BP control  - continue current management and follow-up with PCP               BMI 45.0-49.9, adult (Multi) Z68.42           BMI Readings from Last 1 Encounters:   06/24/24 48.13 kg/m²      - encouraged healthy weight loss via diet and exercise  - consider referral to the weight loss program at follow-up visit                  Disposition     Return to clinic in 3-4 months       Current History    On today's visit, the patient reports that she lives alone and daily routine is very erratic.  She often falls asleep on the couch without her PAP therapy.  She did sleep couple nights with the therapy on and felt great.  She wants to use the therapy but her current habits and routines just didn't seem to be cooperating.  She also spends a lot of time at home, and doesn't seem to have a hobby she likes.  She also hangs out with family and friends about couple times a week but not consistent.  She reported that [facility] called her once for supplies set up but never heard  back from them    PAP Info  DURABLE MEDICAL EQUIPMENT COMPANY: [employer]  Issues with therapy: ISSUES WITH THERAPY: None  Benefits with PAP: PERCEIVED BENEFITS OF PAP: refreshing sleep    PAP Adherence  Not applicable    RLS Followup:   none.    Daytime Symptoms    Patient reports DAYTIME SYMPTOMS: no daytime symptoms  Patient denies daytime symptoms including: Denies: excessive daytime sleepiness    Naps: No  Fatigue: denies feeling fatigue    ESS: 4  KIKI: 5  FOSQ: 36    REVIEW OF SYSTEMS     REVIEW OF SYSTEMS  Review of Systems   Constitutional: Negative.    HENT: Negative.     Respiratory: Negative.     Cardiovascular: Negative.    Genitourinary: Negative.    Skin: Negative.    Neurological: Negative.    Psychiatric/Behavioral: Negative.           ALLERGIES AND MEDICATIONS     ALLERGIES  Allergies   Allergen Reactions    Bupropion Hcl Hives    Penicillins Hives and Unknown    Shellfish Derived Unknown       MEDICATIONS: She has a current medication list which includes the following prescription(s): escitalopram - TAKE 1 TABLET DAILY, losartan - Take 1 tablet (50 mg) by mouth once daily, metoprolol succinate xl - TAKE 1 TABLET DAILY, triamterene-hydrochlorothiazid - TAKE 1 TABLET DAILY, montelukast - TAKE 1 TABLET DAILY, and [DISCONTINUED] semaglutide - TEST.    PAST MEDICAL HISTORY : She  has a past medical history of Body mass index (BMI) 45.0-49.9, adult (Multi) (12/03/2021), Encounter for general adult medical examination without abnormal findings (01/02/2020), Obstructive sleep apnea (adult) (pediatric) (01/28/2020), and Personal history of diseases of the skin and subcutaneous tissue (06/21/2022).    PAST SURGICAL HISTORY: She  has a past surgical history that includes Other surgical history (01/02/2020).     FAMILY HISTORY: No changes since previous visit. Otherwise non-contributory as charted.     SOCIAL HISTORY  She  reports that she has never smoked. She has never used smokeless tobacco.  "Alcohol use questions deferred to the physician. She reports that she does not use drugs.       PHYSICAL EXAM     VITAL SIGNS: /82 (BP Location: Right arm, Patient Position: Sitting, BP Cuff Size: Adult)   Pulse 84   Ht 1.626 m (5' 4\")   Wt 117 kg (258 lb 3.2 oz)   BMI 44.32 kg/m²      PREVIOUS WEIGHTS:  Wt Readings from Last 3 Encounters:   10/25/24 117 kg (258 lb 3.2 oz)   09/24/24 117 kg (258 lb 12.8 oz)   06/24/24 127 kg (280 lb 6.4 oz)         RESULTS/DATA     Bicarbonate (mmol/L)   Date Value   12/19/2023 28   12/14/2022 28   12/03/2021 27   11/20/2020 27               "

## 2024-10-25 NOTE — ASSESSMENT & PLAN NOTE
Patient doesn't really have a regular schedule as she often falls asleep on her couch watching TV.  She is afraid to go up to bed too early (as she usually just falls asleep) and wasting her night away.  However, after lengthy discussion, we agreed that we need to break the cycle somewhere.  She agrees that she should start establishing better daily routines and start going to bed earlier and put her PAP machine on    She is also to find a hobby to do and report back next visit

## 2024-10-25 NOTE — ASSESSMENT & PLAN NOTE
BMI Readings from Last 1 Encounters:   10/25/24 44.32 kg/m²     - encouraged healthy weight loss via diet and exercise  - consider referral to the weight loss program at follow-up visit

## 2024-10-25 NOTE — ASSESSMENT & PLAN NOTE
Encouraged usage extension  Had some issues with [facility] with DME transfer, will look into and make sure that she is established with DME

## 2024-10-25 NOTE — ASSESSMENT & PLAN NOTE
BP Readings from Last 1 Encounters:   10/25/24 123/82     - doing well, asymptomatic  - discussed at length the impact of untreated MAIDA and BP control  - continue current management and follow-up with PCP

## 2024-11-14 ENCOUNTER — APPOINTMENT (OUTPATIENT)
Dept: OBSTETRICS AND GYNECOLOGY | Facility: CLINIC | Age: 49
End: 2024-11-14
Payer: COMMERCIAL

## 2024-12-01 DIAGNOSIS — T78.40XS ALLERGY, SEQUELA: ICD-10-CM

## 2024-12-01 DIAGNOSIS — I10 HYPERTENSION, UNSPECIFIED TYPE: ICD-10-CM

## 2024-12-02 RX ORDER — MONTELUKAST SODIUM 10 MG/1
10 TABLET ORAL DAILY
Qty: 90 TABLET | Refills: 3 | Status: SHIPPED | OUTPATIENT
Start: 2024-12-02

## 2024-12-02 RX ORDER — METOPROLOL SUCCINATE 50 MG/1
50 TABLET, EXTENDED RELEASE ORAL DAILY
Qty: 90 TABLET | Refills: 3 | Status: SHIPPED | OUTPATIENT
Start: 2024-12-02

## 2024-12-20 ENCOUNTER — TELEPHONE (OUTPATIENT)
Dept: OBSTETRICS AND GYNECOLOGY | Facility: CLINIC | Age: 49
End: 2024-12-20
Payer: COMMERCIAL

## 2024-12-24 ENCOUNTER — APPOINTMENT (OUTPATIENT)
Dept: OBSTETRICS AND GYNECOLOGY | Facility: CLINIC | Age: 49
End: 2024-12-24
Payer: COMMERCIAL

## 2024-12-24 VITALS
WEIGHT: 250 LBS | DIASTOLIC BLOOD PRESSURE: 70 MMHG | BODY MASS INDEX: 42.68 KG/M2 | SYSTOLIC BLOOD PRESSURE: 119 MMHG | HEIGHT: 64 IN

## 2024-12-24 DIAGNOSIS — Z30.431 SURVEILLANCE FOR BIRTH CONTROL, INTRAUTERINE DEVICE: ICD-10-CM

## 2024-12-24 DIAGNOSIS — Z01.419 WELL WOMAN EXAM WITH ROUTINE GYNECOLOGICAL EXAM: Primary | ICD-10-CM

## 2024-12-24 DIAGNOSIS — Z12.31 VISIT FOR SCREENING MAMMOGRAM: ICD-10-CM

## 2024-12-24 PROCEDURE — 3008F BODY MASS INDEX DOCD: CPT | Performed by: OBSTETRICS & GYNECOLOGY

## 2024-12-24 PROCEDURE — 1036F TOBACCO NON-USER: CPT | Performed by: OBSTETRICS & GYNECOLOGY

## 2024-12-24 PROCEDURE — 99386 PREV VISIT NEW AGE 40-64: CPT | Performed by: OBSTETRICS & GYNECOLOGY

## 2024-12-24 PROCEDURE — 4010F ACE/ARB THERAPY RXD/TAKEN: CPT | Performed by: OBSTETRICS & GYNECOLOGY

## 2024-12-24 PROCEDURE — 3078F DIAST BP <80 MM HG: CPT | Performed by: OBSTETRICS & GYNECOLOGY

## 2024-12-24 PROCEDURE — 87624 HPV HI-RISK TYP POOLED RSLT: CPT

## 2024-12-24 PROCEDURE — 3044F HG A1C LEVEL LT 7.0%: CPT | Performed by: OBSTETRICS & GYNECOLOGY

## 2024-12-24 PROCEDURE — 3074F SYST BP LT 130 MM HG: CPT | Performed by: OBSTETRICS & GYNECOLOGY

## 2024-12-24 RX ORDER — LEVONORGESTREL 52 MG/1
1 INTRAUTERINE DEVICE INTRAUTERINE ONCE
COMMUNITY

## 2024-12-24 SDOH — ECONOMIC STABILITY: FOOD INSECURITY: WITHIN THE PAST 12 MONTHS, THE FOOD YOU BOUGHT JUST DIDN'T LAST AND YOU DIDN'T HAVE MONEY TO GET MORE.: SOMETIMES TRUE

## 2024-12-24 SDOH — ECONOMIC STABILITY: FOOD INSECURITY: WITHIN THE PAST 12 MONTHS, YOU WORRIED THAT YOUR FOOD WOULD RUN OUT BEFORE YOU GOT MONEY TO BUY MORE.: SOMETIMES TRUE

## 2024-12-24 SDOH — ECONOMIC STABILITY: INCOME INSECURITY: IN THE LAST 12 MONTHS, WAS THERE A TIME WHEN YOU WERE NOT ABLE TO PAY THE MORTGAGE OR RENT ON TIME?: NO

## 2024-12-24 SDOH — ECONOMIC STABILITY: TRANSPORTATION INSECURITY
IN THE PAST 12 MONTHS, HAS THE LACK OF TRANSPORTATION KEPT YOU FROM MEDICAL APPOINTMENTS OR FROM GETTING MEDICATIONS?: NO

## 2024-12-24 SDOH — ECONOMIC STABILITY: TRANSPORTATION INSECURITY
IN THE PAST 12 MONTHS, HAS LACK OF TRANSPORTATION KEPT YOU FROM MEETINGS, WORK, OR FROM GETTING THINGS NEEDED FOR DAILY LIVING?: NO

## 2024-12-24 ASSESSMENT — SOCIAL DETERMINANTS OF HEALTH (SDOH)
WITHIN THE LAST YEAR, HAVE TO BEEN RAPED OR FORCED TO HAVE ANY KIND OF SEXUAL ACTIVITY BY YOUR PARTNER OR EX-PARTNER?: NO
WITHIN THE LAST YEAR, HAVE YOU BEEN AFRAID OF YOUR PARTNER OR EX-PARTNER?: NO
HOW HARD IS IT FOR YOU TO PAY FOR THE VERY BASICS LIKE FOOD, HOUSING, MEDICAL CARE, AND HEATING?: SOMEWHAT HARD
WITHIN THE LAST YEAR, HAVE YOU BEEN KICKED, HIT, SLAPPED, OR OTHERWISE PHYSICALLY HURT BY YOUR PARTNER OR EX-PARTNER?: NO
WITHIN THE LAST YEAR, HAVE YOU BEEN HUMILIATED OR EMOTIONALLY ABUSED IN OTHER WAYS BY YOUR PARTNER OR EX-PARTNER?: NO

## 2024-12-24 ASSESSMENT — PATIENT HEALTH QUESTIONNAIRE - PHQ9
2. FEELING DOWN, DEPRESSED OR HOPELESS: NOT AT ALL
1. LITTLE INTEREST OR PLEASURE IN DOING THINGS: NOT AT ALL
SUM OF ALL RESPONSES TO PHQ9 QUESTIONS 1 & 2: 0

## 2024-12-24 ASSESSMENT — LIFESTYLE VARIABLES
HOW MANY STANDARD DRINKS CONTAINING ALCOHOL DO YOU HAVE ON A TYPICAL DAY: PATIENT DOES NOT DRINK
AUDIT-C TOTAL SCORE: 1
SKIP TO QUESTIONS 9-10: 1
HOW OFTEN DO YOU HAVE A DRINK CONTAINING ALCOHOL: MONTHLY OR LESS
HOW OFTEN DO YOU HAVE SIX OR MORE DRINKS ON ONE OCCASION: NEVER

## 2024-12-24 NOTE — PROGRESS NOTES
"Ralph Saldaña is a 49 y.o. female who is here for a annual exam.     Patient denies bleeding with her IUD    Menopause symptoms: denies    Sexually active: IUD in place and good through 2029    Regular self breast exam: yes  no concerns on her exams    Menstrual History:  OB History          1    Para   1    Term   1            AB        Living             SAB        IAB        Ectopic        Multiple        Live Births                    No LMP recorded (lmp unknown). (Menstrual status: IUD).         Review of Systems  All Normal Review of Systems  Constitutional: no fever, no chills, no recent weight gain, no recent weight loss and no fatigue.    Gastrointestinal: no abdominal pain, no constipation, no nausea, no diarrhea and no vomiting.    Genitourinary: no dysuria, no urinary incontinence, no vaginal dryness, no vaginal itching, no dyspareunia, no pelvic pain, no dysmenorrhea, no sexual problems, no change in urinary frequency, no vaginal discharge, no unexplained vaginal bleeding and no lesion/sore.    Breasts: No masses.  No nipple discharge.  No redness    Objective   /70   Ht 1.626 m (5' 4\")   Wt 113 kg (250 lb)   LMP  (LMP Unknown) Comment: No spotting with IUD  BMI 42.91 kg/m²     OBGyn Exam   Physical Exam  Constitutional: Alert and in no acute distress. Well developed, well nourished.   Head and Face: Head and face: Normal.    Eyes: Normal external exam - nonicteric sclera, extraocular movements intact (EOMI) and no ptosis.   Ears, Nose, Mouth, and Throat: External inspection of ears and nose: Normal.    Neck: No neck asymmetry. Supple. Thyroid not enlarged and there were no palpable thyroid nodules.   Chest: Breasts: Normal appearance, no nipple discharge and no skin changes. Palpation of breasts and axillae: No palpable mass and no axillary lymphadenopathy.   Abdomen: Soft nontender; no abdominal mass palpated. No organomegaly. No hernias.     Genitourinary: "   External genitalia: Normal. No inguinal lymphadenopathy. Bartholin's Urethral and Skenes Glands: Normal. Urethra: Normal.    Bladder: Normal on palpation.   Vagina: Normal. No discharge  Cervix: Normal.  IUD strings visualized and palpated  Uterus: Normal.    Right Adnexa/parametria: Normal.  Left Adnexa/parametria: Normal.    Inspection of Perianal Area: Normal.     Musculoskeletal: No joint swelling seen, normal movements of all extremities.   Skin: Normal skin color and pigmentation, normal skin turgor, and no rash.   Neurologic: Non-focal. Grossly intact.   Psychiatric: Alert and oriented x 3. Affect normal to patient baseline. Mood: Appropriate.      Assessment/Plan   1) Annual exam:  Pap with HPV testing completed  Mammogram order placed  Mirena IUD in place and good through 2029    Thank you again for your visit to our office today  Please follow-up as needed or in 1 year with your annual exam

## 2025-01-06 ENCOUNTER — APPOINTMENT (OUTPATIENT)
Dept: PRIMARY CARE | Facility: CLINIC | Age: 50
End: 2025-01-06
Payer: COMMERCIAL

## 2025-01-06 ENCOUNTER — LAB (OUTPATIENT)
Dept: LAB | Facility: LAB | Age: 50
End: 2025-01-06
Payer: COMMERCIAL

## 2025-01-06 VITALS
HEART RATE: 80 BPM | HEIGHT: 64 IN | TEMPERATURE: 96.9 F | SYSTOLIC BLOOD PRESSURE: 110 MMHG | BODY MASS INDEX: 42.17 KG/M2 | WEIGHT: 247 LBS | DIASTOLIC BLOOD PRESSURE: 73 MMHG | OXYGEN SATURATION: 95 %

## 2025-01-06 DIAGNOSIS — Z00.00 ROUTINE GENERAL MEDICAL EXAMINATION AT A HEALTH CARE FACILITY: ICD-10-CM

## 2025-01-06 DIAGNOSIS — B96.89 BACTERIAL VAGINOSIS: Primary | ICD-10-CM

## 2025-01-06 DIAGNOSIS — Z00.00 ROUTINE GENERAL MEDICAL EXAMINATION AT A HEALTH CARE FACILITY: Primary | ICD-10-CM

## 2025-01-06 DIAGNOSIS — N76.0 BACTERIAL VAGINOSIS: Primary | ICD-10-CM

## 2025-01-06 DIAGNOSIS — E55.9 VITAMIN D DEFICIENCY: ICD-10-CM

## 2025-01-06 DIAGNOSIS — Z12.39 BREAST SCREENING: ICD-10-CM

## 2025-01-06 LAB
ALBUMIN SERPL BCP-MCNC: 4.3 G/DL (ref 3.4–5)
ALP SERPL-CCNC: 92 U/L (ref 33–110)
ALT SERPL W P-5'-P-CCNC: 13 U/L (ref 7–45)
ANION GAP SERPL CALC-SCNC: 11 MMOL/L (ref 10–20)
AST SERPL W P-5'-P-CCNC: 12 U/L (ref 9–39)
BASOPHILS # BLD AUTO: 0.02 X10*3/UL (ref 0–0.1)
BASOPHILS NFR BLD AUTO: 0.3 %
BILIRUB SERPL-MCNC: 0.6 MG/DL (ref 0–1.2)
BUN SERPL-MCNC: 15 MG/DL (ref 6–23)
CALCIUM SERPL-MCNC: 9.7 MG/DL (ref 8.6–10.3)
CHLORIDE SERPL-SCNC: 101 MMOL/L (ref 98–107)
CHOLEST SERPL-MCNC: 175 MG/DL (ref 0–199)
CHOLESTEROL/HDL RATIO: 3.7
CO2 SERPL-SCNC: 29 MMOL/L (ref 21–32)
CREAT SERPL-MCNC: 0.86 MG/DL (ref 0.5–1.05)
CYTOLOGY CMNT CVX/VAG CYTO-IMP: NORMAL
EGFRCR SERPLBLD CKD-EPI 2021: 83 ML/MIN/1.73M*2
EOSINOPHIL # BLD AUTO: 0.15 X10*3/UL (ref 0–0.7)
EOSINOPHIL NFR BLD AUTO: 1.9 %
ERYTHROCYTE [DISTWIDTH] IN BLOOD BY AUTOMATED COUNT: 12.8 % (ref 11.5–14.5)
GLUCOSE SERPL-MCNC: 82 MG/DL (ref 74–99)
HCT VFR BLD AUTO: 43.7 % (ref 36–46)
HDLC SERPL-MCNC: 47.5 MG/DL
HGB BLD-MCNC: 14.1 G/DL (ref 12–16)
HPV HR 12 DNA GENITAL QL NAA+PROBE: NEGATIVE
HPV HR GENOTYPES PNL CVX NAA+PROBE: NEGATIVE
HPV16 DNA SPEC QL NAA+PROBE: NEGATIVE
HPV18 DNA SPEC QL NAA+PROBE: NEGATIVE
IMM GRANULOCYTES # BLD AUTO: 0.03 X10*3/UL (ref 0–0.7)
IMM GRANULOCYTES NFR BLD AUTO: 0.4 % (ref 0–0.9)
LAB AP HPV GENOTYPE QUESTION: YES
LAB AP HPV HR: NORMAL
LABORATORY COMMENT REPORT: NORMAL
LDLC SERPL CALC-MCNC: 98 MG/DL
LYMPHOCYTES # BLD AUTO: 3.48 X10*3/UL (ref 1.2–4.8)
LYMPHOCYTES NFR BLD AUTO: 43.9 %
MCH RBC QN AUTO: 28.8 PG (ref 26–34)
MCHC RBC AUTO-ENTMCNC: 32.3 G/DL (ref 32–36)
MCV RBC AUTO: 89 FL (ref 80–100)
MONOCYTES # BLD AUTO: 0.51 X10*3/UL (ref 0.1–1)
MONOCYTES NFR BLD AUTO: 6.4 %
NEUTROPHILS # BLD AUTO: 3.73 X10*3/UL (ref 1.2–7.7)
NEUTROPHILS NFR BLD AUTO: 47.1 %
NON HDL CHOLESTEROL: 128 MG/DL (ref 0–149)
NRBC BLD-RTO: 0 /100 WBCS (ref 0–0)
PATH REPORT.TOTAL CANCER: NORMAL
PLATELET # BLD AUTO: 367 X10*3/UL (ref 150–450)
POTASSIUM SERPL-SCNC: 4.1 MMOL/L (ref 3.5–5.3)
PROT SERPL-MCNC: 7.2 G/DL (ref 6.4–8.2)
RBC # BLD AUTO: 4.9 X10*6/UL (ref 4–5.2)
SODIUM SERPL-SCNC: 137 MMOL/L (ref 136–145)
TRIGL SERPL-MCNC: 149 MG/DL (ref 0–149)
TSH SERPL-ACNC: 2.71 MIU/L (ref 0.44–3.98)
VLDL: 30 MG/DL (ref 0–40)
WBC # BLD AUTO: 7.9 X10*3/UL (ref 4.4–11.3)

## 2025-01-06 PROCEDURE — 84443 ASSAY THYROID STIM HORMONE: CPT

## 2025-01-06 PROCEDURE — 3048F LDL-C <100 MG/DL: CPT | Performed by: FAMILY MEDICINE

## 2025-01-06 PROCEDURE — 80061 LIPID PANEL: CPT

## 2025-01-06 PROCEDURE — 3078F DIAST BP <80 MM HG: CPT | Performed by: FAMILY MEDICINE

## 2025-01-06 PROCEDURE — 85025 COMPLETE CBC W/AUTO DIFF WBC: CPT

## 2025-01-06 PROCEDURE — 80053 COMPREHEN METABOLIC PANEL: CPT

## 2025-01-06 PROCEDURE — 82306 VITAMIN D 25 HYDROXY: CPT

## 2025-01-06 PROCEDURE — 4010F ACE/ARB THERAPY RXD/TAKEN: CPT | Performed by: FAMILY MEDICINE

## 2025-01-06 PROCEDURE — 99396 PREV VISIT EST AGE 40-64: CPT | Performed by: FAMILY MEDICINE

## 2025-01-06 PROCEDURE — 1036F TOBACCO NON-USER: CPT | Performed by: FAMILY MEDICINE

## 2025-01-06 PROCEDURE — 3074F SYST BP LT 130 MM HG: CPT | Performed by: FAMILY MEDICINE

## 2025-01-06 PROCEDURE — 3008F BODY MASS INDEX DOCD: CPT | Performed by: FAMILY MEDICINE

## 2025-01-06 RX ORDER — METRONIDAZOLE 7.5 MG/G
GEL VAGINAL DAILY
Qty: 70 G | Refills: 0 | Status: SHIPPED | OUTPATIENT
Start: 2025-01-06 | End: 2025-01-07 | Stop reason: SDUPTHER

## 2025-01-06 ASSESSMENT — COLUMBIA-SUICIDE SEVERITY RATING SCALE - C-SSRS
2. HAVE YOU ACTUALLY HAD ANY THOUGHTS OF KILLING YOURSELF?: NO
6. HAVE YOU EVER DONE ANYTHING, STARTED TO DO ANYTHING, OR PREPARED TO DO ANYTHING TO END YOUR LIFE?: NO
1. IN THE PAST MONTH, HAVE YOU WISHED YOU WERE DEAD OR WISHED YOU COULD GO TO SLEEP AND NOT WAKE UP?: NO

## 2025-01-06 ASSESSMENT — PATIENT HEALTH QUESTIONNAIRE - PHQ9
SUM OF ALL RESPONSES TO PHQ9 QUESTIONS 1 AND 2: 0
1. LITTLE INTEREST OR PLEASURE IN DOING THINGS: NOT AT ALL
2. FEELING DOWN, DEPRESSED OR HOPELESS: NOT AT ALL

## 2025-01-06 NOTE — PROGRESS NOTES
"Subjective   Patient ID: Ralph Saldaña is a 49 y.o. female who presents for Annual Exam (physical).    HPI     Review of Systems   All other systems reviewed and are negative.      Objective   /73 (BP Location: Right arm, Patient Position: Sitting, BP Cuff Size: Adult)   Pulse 80   Temp 36.1 °C (96.9 °F) (Temporal)   Ht 1.626 m (5' 4\")   Wt 112 kg (247 lb)   LMP  (LMP Unknown) Comment: No spotting with IUD  SpO2 95%   BMI 42.40 kg/m²     Physical Exam  Cardiovascular:      Rate and Rhythm: Regular rhythm.      Heart sounds: Normal heart sounds.   Pulmonary:      Breath sounds: Normal breath sounds.   Abdominal:      Palpations: Abdomen is soft.   Musculoskeletal:         General: Normal range of motion.      Cervical back: Neck supple.   Skin:     General: Skin is warm.   Neurological:      General: No focal deficit present.      Mental Status: She is alert.   Psychiatric:         Mood and Affect: Mood normal.     Assessment/Plan     This is a 49-year-old female patient who is here for her annual well visit and preventive exam    Her body mass index is still quite high she says she is eating healthier and she gets Zepbound from outside company for this reason I referred her to clinical pharmacy to get on GLP-1    She does have sleep apnea and she will be seeing the sleep specialist coming up    Routine labs were ordered she was asked to go to any [facility] labs if possible today    Also mammogram was ordered    Advised her to come back in 6 months         "

## 2025-01-07 DIAGNOSIS — B96.89 BACTERIAL VAGINOSIS: ICD-10-CM

## 2025-01-07 DIAGNOSIS — N76.0 BACTERIAL VAGINOSIS: ICD-10-CM

## 2025-01-07 LAB — 25(OH)D3 SERPL-MCNC: 58 NG/ML (ref 30–100)

## 2025-01-07 RX ORDER — METRONIDAZOLE 7.5 MG/G
GEL VAGINAL DAILY
Qty: 70 G | Refills: 0 | Status: SHIPPED | OUTPATIENT
Start: 2025-01-07 | End: 2025-01-12

## 2025-01-07 NOTE — TELEPHONE ENCOUNTER
Patient  called in requesting her RX  resent to Pharmacy.Pharmacy which it was sent previous rejected Rx due to it not being a 3 month supply.Patient stated that she would like Rx sent to a different Pharmacy.    Last office visit:12/24/24  Next office visit:12/30/25  Med:metroNIDAZOLE (Metrogel) 0.75 % (37.5mg/5 gram) vaginal gel   Pharmacy:[address]  [phone]      Sera Dougherty CNA

## 2025-01-14 ENCOUNTER — APPOINTMENT (OUTPATIENT)
Dept: PHARMACY | Facility: HOSPITAL | Age: 50
End: 2025-01-14
Payer: COMMERCIAL

## 2025-01-14 NOTE — PROGRESS NOTES
Clinical Pharmacy Appointment    Patient ID: Ralph Saldaña is a 49 y.o. female who presents for Weight Loss.    Pt is here for First appointment.     Referring Provider: Evangelina Floyd  PCP: Evangelina Floyd MD   Last visit with PCP: 1/6/25   Next visit with PCP: 7/14/25    Subjective     HPI  WEIGHT LOSS  BMI Readings from Last 3 Encounters:   01/06/25 42.40 kg/m²   12/24/24 42.91 kg/m²   10/25/24 44.32 kg/m²      Starting weight: 286 lbs. (2/24/24)  Current weight: 244 lbs.    Lifestyle  Diet: Very little intake currently  BK: none  LN: not much  DN: beef and noodles  Snacks: None  Drinks: Water, lemonade, occasional pop  Has worked with nutritionist/dietician? Yes, many years ago  Physical Activity: None    Other Potential Contributing Factors  Alcohol use: Average 0-1 drinks/week  Tobacco use: No  Other drug use: No  Medications that may cause weight gain: none  Mental health: No current concerns  Eating Disorders: Denies Hx of any eating disorder  Comorbidities: MAIDA    Non-Pharmacological Therapy  Weight loss techniques attempted:  None    Pharmacological Therapy  Current Medications: Zepbound 15 mg weekly (Wednesday)  Adverse Effects: Appetite suppression  Previous Medications: Trulicity     Insurance coverage of weight-loss medications? Yes, Zepbound  Eligible for newScale cards/programs? Yes  Eligible for  PAP? N/A    Medication Reconciliation:  Changed:   Added:   Discontinued:     Drug Interactions  No relevant drug interactions were noted.    Medication System Management  Patient's preferred pharmacy: [facility]  Adherence/Organization: Takes medications as prescribed  Affordability/Accessibility: No issues    Objective   Allergies   Allergen Reactions    Bupropion Hcl Hives    Penicillins Hives and Unknown    Shellfish Derived Unknown     Social History     Social History Narrative    Not on file      Medication Review  Current Outpatient Medications  "  Medication Instructions    escitalopram (LEXAPRO) 20 mg, oral, Daily    levonorgestrel (Mirena) 20 mcg/24hr IUD 1 each, Once    losartan (COZAAR) 50 mg, oral, Daily    metoprolol succinate XL (TOPROL-XL) 50 mg, oral, Daily    montelukast (SINGULAIR) 10 mg, oral, Daily    triamterene-hydrochlorothiazid (Maxzide-25) 37.5-25 mg tablet 1 tablet, oral, Daily      Vitals  BP Readings from Last 2 Encounters:   01/06/25 110/73   12/24/24 119/70     BMI Readings from Last 1 Encounters:   01/06/25 42.40 kg/m²      Labs  A1C  Lab Results   Component Value Date    HGBA1C 5.9 09/24/2024    HGBA1C 7.4 (H) 06/04/2024    HGBA1C 6.9 (H) 12/19/2023     BMP  Lab Results   Component Value Date    CALCIUM 9.7 01/06/2025     01/06/2025    K 4.1 01/06/2025    CO2 29 01/06/2025     01/06/2025    BUN 15 01/06/2025    CREATININE 0.86 01/06/2025    EGFR 83 01/06/2025     LFTs  Lab Results   Component Value Date    ALT 13 01/06/2025    AST 12 01/06/2025    ALKPHOS 92 01/06/2025    BILITOT 0.6 01/06/2025     FLP  Lab Results   Component Value Date    TRIG 149 01/06/2025    CHOL 175 01/06/2025    LDLF 92 04/20/2023    LDLCALC 98 01/06/2025    HDL 47.5 01/06/2025     Urine Microalbumin  No results found for: \"MICROALBCREA\"  Weight Management  Wt Readings from Last 3 Encounters:   01/06/25 112 kg (247 lb)   12/24/24 113 kg (250 lb)   10/25/24 117 kg (258 lb 3.2 oz)      There is no height or weight on file to calculate BMI.     Assessment/Plan   Problem List Items Addressed This Visit       BMI 40.0-44.9, adult (Multi) - Primary     Current regimen includes Zepbound 15 mg. Patient was enrolled in a program where she was prescribed Zepbound, but lifestyle counseling was not provided. She would prefer to work with clinical pharmacy. Patient's current weight reported as 244 lbs. Has lost 42 lbs (14.7% of TBW) since starting therapy plan. Due to being at maximum strength, plan to continue current dose. Would consider decreasing dose if " patient's appetite remains this suppressed. Patient reports she had tried to stay on 12.5 mg, but insurance reportedly did not allow it. Had in-depth discussion regarding snacking and small portion sizes to accommodate suppressed appetite. Also strongly encouraged physical activity as patient has a treadmill at home. She is also waiting for a desk pedaler to arrive to use at work.     Medication Changes:  CONTINUE  Zepbound 15 mg weekly (Wednesday)    Monitoring and Education:  Reviewed dietary recommendations: Healthy Plate method          Relevant Orders    Referral to Clinical Pharmacy     Clinical Pharmacist follow-up: 1/22/25, Telehealth visit    Continue all meds under the continuation of care with the referring provider and clinical pharmacy team.    Thank you,  Prisca Pruett, PharmD  Clinical Pharmacist  [phone]    Verbal consent to manage patient's drug therapy was obtained from the patient. They were informed they may decline to participate or withdraw from participation in pharmacy services at any time.

## 2025-01-15 NOTE — ASSESSMENT & PLAN NOTE
Current regimen includes Zepbound 15 mg. Patient was enrolled in a program where she was prescribed Zepbound, but lifestyle counseling was not provided. She would prefer to work with clinical pharmacy. Patient's current weight reported as 244 lbs. Has lost 42 lbs (14.7% of TBW) since starting therapy plan. Due to being at maximum strength, plan to continue current dose. Would consider decreasing dose if patient's appetite remains this suppressed. Patient reports she had tried to stay on 12.5 mg, but insurance reportedly did not allow it. Had in-depth discussion regarding snacking and small portion sizes to accommodate suppressed appetite. Also strongly encouraged physical activity as patient has a treadmill at home. She is also waiting for a desk pedaler to arrive to use at work.     Medication Changes:  CONTINUE  Zepbound 15 mg weekly (Wednesday)    Monitoring and Education:  Reviewed dietary recommendations: Healthy Plate method

## 2025-01-22 ENCOUNTER — APPOINTMENT (OUTPATIENT)
Dept: PHARMACY | Facility: HOSPITAL | Age: 50
End: 2025-01-22
Payer: COMMERCIAL

## 2025-01-22 DIAGNOSIS — E11.9 TYPE 2 DIABETES MELLITUS WITHOUT COMPLICATION, WITHOUT LONG-TERM CURRENT USE OF INSULIN (MULTI): Primary | ICD-10-CM

## 2025-01-22 NOTE — PROGRESS NOTES
Clinical Pharmacy Appointment    Patient ID: Ralph Saldaña is a 49 y.o. female who presents for Weight Loss and Diabetes.    Pt is here for Follow Up appointment.     Referring Provider: Evangelina Floyd  PCP: Evangelina Floyd MD   Last visit with PCP: 1/6/25   Next visit with PCP: 7/14/25    Subjective   HPI  DIABETES MELLITUS TYPE 2:    Diagnosed with diabetes:  2022.  Does patient follow with Endocrinology: No  Last optometry exam: N/A  Most recent visit in Podiatry: None -- patient denies sores or cuts on feet today      Current diabetic medications include:  None    Clarifications to above regimen: has been taking Zepbound 15 mg  Adverse Effects: significant appetite suppression    Past diabetic medications include:  Metformin (diarrhea)  Trulicity (cost)    Glucose Readings:  Glucometer/CGM Type: None    Any episodes of hypoglycemia? No.  Did patient treat episode of hypoglycemia appropriately? N/A  Does the patient have a prescription for ready-to-use Glucagon? Not on insulin  Does pt have proteinuria? N/A    Secondary Prevention:  Statin? No  ACE-I/ARB? Yes  Aspirin? No    Pertinent PMH Review:  PMH of Pancreatitis: No  PMH of Retinopathy: No  PMH of Urinary Tract Infections: No  PMH of MTC: No    Immunizations:  Influenza? Yes  COVID? Yes  Pneumonia? No    WEIGHT LOSS  BMI Readings from Last 3 Encounters:   01/06/25 42.40 kg/m²   12/24/24 42.91 kg/m²   10/25/24 44.32 kg/m²      Starting weight: 286 lbs. (2/24/24)  Last weight: 244 lbs  Current weight: 244 lbs.    Lifestyle  Diet: Very little intake currently, but has made changes since last visit. She was eating about one meal a day. Now she is trying to add snacks in addition to that meal. She tried having yogurt in the morning once, but couldn't finish the cup because she got full. Able to have a smoothie and not feel too full. She is trying to supplement with a protein shake as well.  BK: none  LN: salad and beans  DN: none  Snacks:  Jello cups, peanut butter, fruit  Drinks: Water (more since last visit), lemonade, occasional pop  Has worked with nutritionist/dietician? Yes, many years ago  Physical Activity: Bowling over the weekend, more activity than normal. Cleaned off treadmill and received desk pedaler, but she hasn't used either yet.    Other Potential Contributing Factors  Alcohol use: Average 0-1 drinks/week  Tobacco use: No  Other drug use: No  Medications that may cause weight gain: none  Mental health: No current concerns  Eating Disorders: Denies Hx of any eating disorder  Comorbidities: MAIDA    Non-Pharmacological Therapy  Weight loss techniques attempted:  None    Pharmacological Therapy  Current Medications: Zepbound 15 mg weekly (Wednesday)  Adverse Effects: Significant appetite suppression  Previous Medications: Trulicity     Insurance coverage of weight-loss medications? Yes, Zepbound  Eligible for [employer] cards/programs? Yes  Eligible for  PAP? N/A     Drug Interactions  No relevant drug interactions were noted.    Medication System Management  Patient's preferred pharmacy: [facility]  Adherence/Organization: Takes medications as prescribed  Affordability/Accessibility: No issues    Objective   Allergies   Allergen Reactions    Bupropion Hcl Hives    Penicillins Hives and Unknown    Shellfish Derived Unknown     Social History     Social History Narrative    Not on file      Medication Review  Current Outpatient Medications   Medication Instructions    escitalopram (LEXAPRO) 20 mg, oral, Daily    levonorgestrel (Mirena) 20 mcg/24hr IUD 1 each, Once    losartan (COZAAR) 50 mg, oral, Daily    metoprolol succinate XL (TOPROL-XL) 50 mg, oral, Daily    montelukast (SINGULAIR) 10 mg, oral, Daily    Mounjaro 15 mg, subcutaneous, Every 7 days    triamterene-hydrochlorothiazid (Maxzide-25) 37.5-25 mg tablet 1 tablet, oral, Daily      Vitals  BP Readings from Last 2 Encounters:   01/06/25 110/73   12/24/24  "119/70     BMI Readings from Last 1 Encounters:   01/06/25 42.40 kg/m²      Labs  A1C  Lab Results   Component Value Date    HGBA1C 5.9 09/24/2024    HGBA1C 7.4 (H) 06/04/2024    HGBA1C 6.9 (H) 12/19/2023     BMP  Lab Results   Component Value Date    CALCIUM 9.7 01/06/2025     01/06/2025    K 4.1 01/06/2025    CO2 29 01/06/2025     01/06/2025    BUN 15 01/06/2025    CREATININE 0.86 01/06/2025    EGFR 83 01/06/2025     LFTs  Lab Results   Component Value Date    ALT 13 01/06/2025    AST 12 01/06/2025    ALKPHOS 92 01/06/2025    BILITOT 0.6 01/06/2025     FLP  Lab Results   Component Value Date    TRIG 149 01/06/2025    CHOL 175 01/06/2025    LDLF 92 04/20/2023    LDLCALC 98 01/06/2025    HDL 47.5 01/06/2025     Urine Microalbumin  No results found for: \"MICROALBCREA\"  Weight Management  Wt Readings from Last 3 Encounters:   01/06/25 112 kg (247 lb)   12/24/24 113 kg (250 lb)   10/25/24 117 kg (258 lb 3.2 oz)      There is no height or weight on file to calculate BMI.     Assessment/Plan   Problem List Items Addressed This Visit       Type 2 diabetes mellitus without complication, without long-term current use of insulin (Multi) - Primary     Patient's goal A1c is < 7%.  Is pt at goal? Yes, A1C was 5.9% in September  Patient does not monitor blood sugars at home since her last A1C. Denies symptoms of hypo- or hyperglycemia. She is working on weight loss and had been taking Zepbound, but her insurance plan has removed it from the formulary. We will change to Mounjaro since she has a type 2 diabetes diagnosis. Encouraged healthy lifestyle by incorporating more physical activity.    Medication Changes:  START  Mounjaro 15 mg weekly         Relevant Medications    tirzepatide (Mounjaro) 15 mg/0.5 mL pen injector    BMI 40.0-44.9, adult (Multi)     Current regimen includes Zepbound 15 mg. Patient's current weight reported as 244 lbs. Has lost 42 lbs (14.7% of TBW) since starting therapy plan. Due to being at " maximum strength and adverse effects getting better, plan to continue current dose (has one more dose left). Her insurance formulary changed and no longer covers Zepbound. Patient does have type 2 diabetes diagnosis, so we will change to Mounjaro. Patient is incorporating more food into her day in small increments. Encouraged to increase physical activity as well.    Medication Changes:  STOP:  Zepbound 15 mg weekly (Wednesday)         Relevant Medications    tirzepatide (Mounjaro) 15 mg/0.5 mL pen injector    Other Relevant Orders    Referral to Clinical Pharmacy     Clinical Pharmacist follow-up: 1/30/25, Telehealth visit    Continue all meds under the continuation of care with the referring provider and clinical pharmacy team.    Thank you,  Prisca Pruett, PharmD  Clinical Pharmacist  [phone]    Verbal consent to manage patient's drug therapy was obtained from the patient. They were informed they may decline to participate or withdraw from participation in pharmacy services at any time.

## 2025-01-23 RX ORDER — TIRZEPATIDE 15 MG/.5ML
15 INJECTION, SOLUTION SUBCUTANEOUS
Qty: 2 ML | Refills: 2 | Status: SHIPPED | OUTPATIENT
Start: 2025-01-23

## 2025-01-23 NOTE — ASSESSMENT & PLAN NOTE
Patient's goal A1c is < 7%.  Is pt at goal? Yes, A1C was 5.9% in September  Patient does not monitor blood sugars at home since her last A1C. Denies symptoms of hypo- or hyperglycemia. She is working on weight loss and had been taking Zepbound, but her insurance plan has removed it from the formulary. We will change to Mounjaro since she has a type 2 diabetes diagnosis. Encouraged healthy lifestyle by incorporating more physical activity.    Medication Changes:  START  Mounjaro 15 mg weekly

## 2025-01-23 NOTE — ASSESSMENT & PLAN NOTE
Current regimen includes Zepbound 15 mg. Patient's current weight reported as 244 lbs. Has lost 42 lbs (14.7% of TBW) since starting therapy plan. Due to being at maximum strength and adverse effects getting better, plan to continue current dose (has one more dose left). Her insurance formulary changed and no longer covers Zepbound. Patient does have type 2 diabetes diagnosis, so we will change to Mounro. Patient is incorporating more food into her day in small increments. Encouraged to increase physical activity as well.    Medication Changes:  STOP:  Zepbound 15 mg weekly (Wednesday)

## 2025-01-30 ENCOUNTER — APPOINTMENT (OUTPATIENT)
Dept: PHARMACY | Facility: HOSPITAL | Age: 50
End: 2025-01-30
Payer: COMMERCIAL

## 2025-01-30 DIAGNOSIS — E11.9 TYPE 2 DIABETES MELLITUS WITHOUT COMPLICATION, WITHOUT LONG-TERM CURRENT USE OF INSULIN (MULTI): Primary | ICD-10-CM

## 2025-01-30 NOTE — ASSESSMENT & PLAN NOTE
Patient's goal A1c is < 7%.  Is pt at goal? Yes, A1C was 5.9% in September  Patient does not monitor blood sugars at home since her last A1C was well controlled. Denies symptoms of hypo- or hyperglycemia. She is working on weight loss and had been taking Zepbound, but her insurance plan has removed it from the formulary. We will change to Mounjaro since she has a type 2 diabetes diagnosis. Encouraged healthy lifestyle by incorporating more physical activity.    Medication Changes:  CONTINUE:  Mounjaro 15 mg weekly

## 2025-01-30 NOTE — PROGRESS NOTES
Clinical Pharmacy Appointment    Patient ID: Ralph Saldaña is a 49 y.o. female who presents for Diabetes and Weight Loss.    Pt is here for Follow Up appointment.     Referring Provider: Evangelina Floyd  PCP: Evangelina Floyd MD   Last visit with PCP: 1/6/25   Next visit with PCP: 7/14/25    Subjective   HPI  DIABETES MELLITUS TYPE 2:    Diagnosed with diabetes:  2022.  Does patient follow with Endocrinology: No  Last optometry exam: N/A  Most recent visit in Podiatry: None -- patient denies sores or cuts on feet today      Current diabetic medications include:  Mounjaro 15 mg weekly (Wednesday)    Clarifications to above regimen: none  Adverse Effects: significant appetite suppression    Past diabetic medications include:  Metformin (diarrhea)  Trulicity (cost)    Glucose Readings:  Glucometer/CGM Type: None    Any episodes of hypoglycemia? No.  Did patient treat episode of hypoglycemia appropriately? N/A  Does the patient have a prescription for ready-to-use Glucagon? Not on insulin  Does pt have proteinuria? N/A    Secondary Prevention:  Statin? No  ACE-I/ARB? Yes  Aspirin? No    Pertinent PMH Review:  PMH of Pancreatitis: No  PMH of Retinopathy: No  PMH of Urinary Tract Infections: No  PMH of MTC: No    Immunizations:  Influenza? Yes  COVID? Yes  Pneumonia? No    WEIGHT LOSS  BMI Readings from Last 3 Encounters:   01/06/25 42.40 kg/m²   12/24/24 42.91 kg/m²   10/25/24 44.32 kg/m²      Starting weight: 286 lbs. (2/24/24)  Last weight: 244 lbs  Current weight: 242 lbs.    Lifestyle  Diet: Slowly increasing intake compared to a couple weeks ago. She was eating about one meal a day. Now she is trying to add snacks in addition to that meal. She is trying to supplement with a protein shake as well.  BK: smoothie  LN: salad and beans  DN: none  Snacks: Jello cups, peanut butter, fruit  Drinks: Water (more since last visit), lemonade, occasional pop  Has worked with nutritionist/dietician? Yes, many  years ago  Physical Activity: Cleaned off treadmill and received desk pedaler, but she hasn't used either yet.    Other Potential Contributing Factors  Alcohol use: Average 0-1 drinks/week  Tobacco use: No  Other drug use: No  Medications that may cause weight gain: none  Mental health: No current concerns  Eating Disorders: Denies Hx of any eating disorder  Comorbidities: MAIDA    Non-Pharmacological Therapy  Weight loss techniques attempted:  None    Pharmacological Therapy  Current Medications: Zepbound 15 mg weekly (Wednesday)  Adverse Effects: Significant appetite suppression  Previous Medications: Trulicity     Insurance coverage of weight-loss medications? No, Zepbound and Wegovy are not on formulary this year. But patient has diabetes diagnosis and has switched to Mounjaro.  Eligible for copay cards/programs? Yes  Eligible for  PAP? N/A     Drug Interactions  No relevant drug interactions were noted.    Medication System Management  Patient's preferred [facility]: [facility]  Adherence/Organization: Takes medications as prescribed  Affordability/Accessibility: No issues    Objective   Allergies   Allergen Reactions    Bupropion Hcl Hives    Penicillins Hives and Unknown    Shellfish Derived Unknown     Social History     Social History Narrative    Not on file      Medication Review  Current Outpatient Medications   Medication Instructions    escitalopram (LEXAPRO) 20 mg, oral, Daily    levonorgestrel (Mirena) 20 mcg/24hr IUD 1 each, Once    losartan (COZAAR) 50 mg, oral, Daily    metoprolol succinate XL (TOPROL-XL) 50 mg, oral, Daily    montelukast (SINGULAIR) 10 mg, oral, Daily    Mounjaro 15 mg, subcutaneous, Every 7 days    triamterene-hydrochlorothiazid (Maxzide-25) 37.5-25 mg tablet 1 tablet, oral, Daily      Vitals  BP Readings from Last 2 Encounters:   01/06/25 110/73   12/24/24 119/70     BMI Readings from Last 1 Encounters:   01/06/25 42.40 kg/m²      Labs  A1C  Lab  "Results   Component Value Date    HGBA1C 5.9 09/24/2024    HGBA1C 7.4 (H) 06/04/2024    HGBA1C 6.9 (H) 12/19/2023     BMP  Lab Results   Component Value Date    CALCIUM 9.7 01/06/2025     01/06/2025    K 4.1 01/06/2025    CO2 29 01/06/2025     01/06/2025    BUN 15 01/06/2025    CREATININE 0.86 01/06/2025    EGFR 83 01/06/2025     LFTs  Lab Results   Component Value Date    ALT 13 01/06/2025    AST 12 01/06/2025    ALKPHOS 92 01/06/2025    BILITOT 0.6 01/06/2025     FLP  Lab Results   Component Value Date    TRIG 149 01/06/2025    CHOL 175 01/06/2025    LDLF 92 04/20/2023    LDLCALC 98 01/06/2025    HDL 47.5 01/06/2025     Urine Microalbumin  No results found for: \"MICROALBCREA\"  Weight Management  Wt Readings from Last 3 Encounters:   01/06/25 112 kg (247 lb)   12/24/24 113 kg (250 lb)   10/25/24 117 kg (258 lb 3.2 oz)      There is no height or weight on file to calculate BMI.     Assessment/Plan   Problem List Items Addressed This Visit       Type 2 diabetes mellitus without complication, without long-term current use of insulin (Multi) - Primary     Patient's goal A1c is < 7%.  Is pt at goal? Yes, A1C was 5.9% in September  Patient does not monitor blood sugars at home since her last A1C was well controlled. Denies symptoms of hypo- or hyperglycemia. She is working on weight loss and had been taking Zepbound, but her insurance plan has removed it from the formulary. We will change to Mounjaro since she has a type 2 diabetes diagnosis. Encouraged healthy lifestyle by incorporating more physical activity.    Medication Changes:  CONTINUE:  Mounjaro 15 mg weekly         Relevant Orders    Referral to Clinical Pharmacy    BMI 40.0-44.9, adult (Multi)     Current regimen includes Mounjaro 15 mg. Patient's current weight reported as 242 lbs. Has lost 44 lbs (15.4% of TBW) since starting therapy plan. Patient is incorporating more food into her day in small increments. She feels this is working pretty well. " Having fewer instances of overeating and feeling sick. She has been using the desk pedaler intermittently but not the treadmill. She will start using exercise bands at home.    Medication Changes:  CONTINUE:   Mounjaro 15 mg weekly         Relevant Orders    Referral to Clinical Pharmacy     Clinical Pharmacist follow-up: 2/5/25, Telehealth visit    Continue all meds under the continuation of care with the referring provider and clinical pharmacy team.    Thank you,  Prisca Pruett, PharmD  Clinical Pharmacist  [phone]    Verbal consent to manage patient's drug therapy was obtained from the patient. They were informed they may decline to participate or withdraw from participation in pharmacy services at any time.

## 2025-01-30 NOTE — ASSESSMENT & PLAN NOTE
Current regimen includes Mounjaro 15 mg. Patient's current weight reported as 242 lbs. Has lost 44 lbs (15.4% of TBW) since starting therapy plan. Patient is incorporating more food into her day in small increments. She feels this is working pretty well. Having fewer instances of overeating and feeling sick. She has been using the desk pedaler intermittently but not the treadmill. She will start using exercise bands at home.    Medication Changes:  CONTINUE:   Mounjaro 15 mg weekly

## 2025-02-05 ENCOUNTER — APPOINTMENT (OUTPATIENT)
Dept: PHARMACY | Facility: HOSPITAL | Age: 50
End: 2025-02-05
Payer: COMMERCIAL

## 2025-02-05 NOTE — ASSESSMENT & PLAN NOTE
Current regimen includes Mounjaro 15 mg. Patient's current weight reported as 244 lbs. She thinks she gained a couple pounds because of eating more sweets than usual in the past week. Has lost 42 lbs (15% of TBW) since starting therapy plan. Patient is incorporating more food into her day in small increments. She feels this is working pretty well. Having fewer instances of overeating and feeling sick. She has been using the desk pedaler intermittently but not the treadmill. She will start using exercise bands at home.    Medication Changes:  CONTINUE:   Mounjaro 15 mg weekly

## 2025-02-05 NOTE — PROGRESS NOTES
Clinical Pharmacy Appointment    Patient ID: Ralph Saldaña is a 49 y.o. female who presents for Weight Loss.    Pt is here for Follow Up appointment.     Referring Provider: Evangelina Floyd  PCP: Evangelina Floyd MD   Last visit with PCP: 1/6/25   Next visit with PCP: 7/14/25    Subjective   HPI  WEIGHT LOSS  BMI Readings from Last 3 Encounters:   01/06/25 42.40 kg/m²   12/24/24 42.91 kg/m²   10/25/24 44.32 kg/m²      Starting weight: 286 lbs. (2/24/24)  Last weight: 242 lbs  Current weight: 244 lbs.    Lifestyle  Diet: Slowly increasing intake compared to 3 weeks ago. She was eating about one meal a day. Now she is trying to add snacks in addition to that meal. She is trying to supplement with a protein shake as well. Ate more sweets this past week compared to previously.  BK: smoothie  LN: salad and beans  DN: none  Snacks: Jello cups, peanut butter, fruit  Drinks: Water (more since last visit), lemonade, occasional pop  Has worked with nutritionist/dietician? Yes, many years ago  Physical Activity: Started using desk pedaler but not treadmill. Has resistance bands.    Other Potential Contributing Factors  Alcohol use: Average 0-1 drinks/week  Tobacco use: No  Other drug use: No  Medications that may cause weight gain: none  Mental health: No current concerns  Eating Disorders: Denies Hx of any eating disorder  Comorbidities: MAIDA    Non-Pharmacological Therapy  Weight loss techniques attempted:  None    Pharmacological Therapy  Current Medications: Zepbound 15 mg weekly (Wednesday)  Adverse Effects: Significant appetite suppression  Previous Medications: Trulicity     Insurance coverage of weight-loss medications? No, Zepbound and Wegovy are not on formulary this year. But patient has diabetes diagnosis and has switched to Mounjaro.  Eligible for copay cards/programs? Yes  Eligible for  PAP? N/A     Drug Interactions  No relevant drug interactions were noted.    Medication System  "Management  Patient's preferred pharmacy: [facility]  Adherence/Organization: Takes medications as prescribed  Affordability/Accessibility: No issues    Objective   Allergies   Allergen Reactions    Bupropion Hcl Hives    Penicillins Hives and Unknown    Shellfish Derived Unknown     Social History     Social History Narrative    Not on file      Medication Review  Current Outpatient Medications   Medication Instructions    escitalopram (LEXAPRO) 20 mg, oral, Daily    levonorgestrel (Mirena) 20 mcg/24hr IUD 1 each, Once    losartan (COZAAR) 50 mg, oral, Daily    metoprolol succinate XL (TOPROL-XL) 50 mg, oral, Daily    montelukast (SINGULAIR) 10 mg, oral, Daily    Mounjaro 15 mg, subcutaneous, Every 7 days    triamterene-hydrochlorothiazid (Maxzide-25) 37.5-25 mg tablet 1 tablet, oral, Daily      Vitals  BP Readings from Last 2 Encounters:   01/06/25 110/73   12/24/24 119/70     BMI Readings from Last 1 Encounters:   01/06/25 42.40 kg/m²      Labs  A1C  Lab Results   Component Value Date    HGBA1C 5.9 09/24/2024    HGBA1C 7.4 (H) 06/04/2024    HGBA1C 6.9 (H) 12/19/2023     BMP  Lab Results   Component Value Date    CALCIUM 9.7 01/06/2025     01/06/2025    K 4.1 01/06/2025    CO2 29 01/06/2025     01/06/2025    BUN 15 01/06/2025    CREATININE 0.86 01/06/2025    EGFR 83 01/06/2025     LFTs  Lab Results   Component Value Date    ALT 13 01/06/2025    AST 12 01/06/2025    ALKPHOS 92 01/06/2025    BILITOT 0.6 01/06/2025     FLP  Lab Results   Component Value Date    TRIG 149 01/06/2025    CHOL 175 01/06/2025    LDLF 92 04/20/2023    LDLCALC 98 01/06/2025    HDL 47.5 01/06/2025     Urine Microalbumin  No results found for: \"MICROALBCREA\"  Weight Management  Wt Readings from Last 3 Encounters:   01/06/25 112 kg (247 lb)   12/24/24 113 kg (250 lb)   10/25/24 117 kg (258 lb 3.2 oz)      There is no height or weight on file to calculate BMI.     Assessment/Plan   Problem List Items " Addressed This Visit       BMI 40.0-44.9, adult (Multi)     Current regimen includes Mounjaro 15 mg. Patient's current weight reported as 244 lbs. She thinks she gained a couple pounds because of eating more sweets than usual in the past week. Has lost 42 lbs (15% of TBW) since starting therapy plan. Patient is incorporating more food into her day in small increments. She feels this is working pretty well. Having fewer instances of overeating and feeling sick. She has been using the desk pedaler intermittently but not the treadmill. She will start using exercise bands at home.    Medication Changes:  CONTINUE:   Mounjaro 15 mg weekly         Relevant Orders    Referral to Clinical Pharmacy     Clinical Pharmacist follow-up: 2/19/25, Telehealth visit    Continue all meds under the continuation of care with the referring provider and clinical pharmacy team.    Thank you,  Prisca Pruett, PharmD  Clinical Pharmacist  [phone]    Verbal consent to manage patient's drug therapy was obtained from the patient. They were informed they may decline to participate or withdraw from participation in pharmacy services at any time.

## 2025-02-19 ENCOUNTER — APPOINTMENT (OUTPATIENT)
Dept: PHARMACY | Facility: HOSPITAL | Age: 50
End: 2025-02-19
Payer: COMMERCIAL

## 2025-02-19 NOTE — PROGRESS NOTES
Clinical Pharmacy Appointment    Patient ID: Ralph Saldaña is a 49 y.o. female who presents for No chief complaint on file..    Pt is here for Follow Up appointment.     Referring Provider: Evangelina Floyd  PCP: Evangelina Floyd MD   Last visit with PCP: 1/6/25   Next visit with PCP: 7/14/25    Subjective   HPI  WEIGHT LOSS  BMI Readings from Last 3 Encounters:   01/06/25 42.40 kg/m²   12/24/24 42.91 kg/m²   10/25/24 44.32 kg/m²      Starting weight: 286 lbs. (2/24/24)  Last weight: 244 lbs  Current weight: 238 lbs.    Lifestyle  Diet: Slowly increasing food intake compared to a month ago. She was eating about one meal a day. Now she is trying to add snacks in addition to that meal. She is trying to supplement with a protein shake as well. Ate more sweets this past week compared to previously.  BK: smoothie  LN: salad and beans  DN: none  Snacks: Jello cups, peanut butter, fruit  Drinks: Water (more since last visit), lemonade, occasional pop  Has worked with nutritionist/dietician? Yes, many years ago  Physical Activity: Started using desk pedaler but not treadmill. Has resistance bands.    Other Potential Contributing Factors  Alcohol use: Average 0-1 drinks/week  Tobacco use: No  Other drug use: No  Medications that may cause weight gain: none  Mental health: No current concerns  Eating Disorders: Denies Hx of any eating disorder  Comorbidities: MAIDA    Non-Pharmacological Therapy  Weight loss techniques attempted:  None    Pharmacological Therapy  Current Medications: Mounjaro 15 mg weekly (Wednesday)  Adverse Effects: Significant appetite suppression  Previous Medications: Trulicity     Insurance coverage of weight-loss medications? No, Zepbound and Wegovy are not on formulary this year. But patient has diabetes diagnosis and has switched to Mounjaro.  Eligible for copay cards/programs? Yes  Eligible for  PAP? N/A     Drug Interactions  No relevant drug interactions were  "noted.    Medication System Management  Patient's preferred pharmacy: [facility]  Adherence/Organization: Takes medications as prescribed  Affordability/Accessibility: No issues    Objective   Allergies   Allergen Reactions    Bupropion Hcl Hives    Penicillins Hives and Unknown    Shellfish Derived Unknown     Social History     Social History Narrative    Not on file      Medication Review  Current Outpatient Medications   Medication Instructions    escitalopram (LEXAPRO) 20 mg, oral, Daily    levonorgestrel (Mirena) 20 mcg/24hr IUD 1 each, Once    losartan (COZAAR) 50 mg, oral, Daily    metoprolol succinate XL (TOPROL-XL) 50 mg, oral, Daily    montelukast (SINGULAIR) 10 mg, oral, Daily    Mounjaro 15 mg, subcutaneous, Every 7 days    triamterene-hydrochlorothiazid (Maxzide-25) 37.5-25 mg tablet 1 tablet, oral, Daily      Vitals  BP Readings from Last 2 Encounters:   01/06/25 110/73   12/24/24 119/70     BMI Readings from Last 1 Encounters:   01/06/25 42.40 kg/m²      Labs  A1C  Lab Results   Component Value Date    HGBA1C 5.9 09/24/2024    HGBA1C 7.4 (H) 06/04/2024    HGBA1C 6.9 (H) 12/19/2023     BMP  Lab Results   Component Value Date    CALCIUM 9.7 01/06/2025     01/06/2025    K 4.1 01/06/2025    CO2 29 01/06/2025     01/06/2025    BUN 15 01/06/2025    CREATININE 0.86 01/06/2025    EGFR 83 01/06/2025     LFTs  Lab Results   Component Value Date    ALT 13 01/06/2025    AST 12 01/06/2025    ALKPHOS 92 01/06/2025    BILITOT 0.6 01/06/2025     FLP  Lab Results   Component Value Date    TRIG 149 01/06/2025    CHOL 175 01/06/2025    LDLF 92 04/20/2023    LDLCALC 98 01/06/2025    HDL 47.5 01/06/2025     Urine Microalbumin  No results found for: \"MICROALBCREA\"  Weight Management  Wt Readings from Last 3 Encounters:   01/06/25 112 kg (247 lb)   12/24/24 113 kg (250 lb)   10/25/24 117 kg (258 lb 3.2 oz)      There is no height or weight on file to calculate BMI. "     Assessment/Plan   Problem List Items Addressed This Visit       BMI 40.0-44.9, adult (Multi)     Current regimen includes Mounjaro 15 mg. Patient's current weight reported as 238 lbs. She is back on track with weight loss. Has lost 48 lbs (16% of TBW) since starting therapy plan. Patient is incorporating more food into her day in small increments. She feels this is working pretty well. Having fewer instances of overeating and feeling sick. Admits she is still eating sweets at work occasionally. She has been using the desk pedaler intermittently but not the treadmill. She will start using exercise bands at home when she orders a new set.    Medication Changes:  CONTINUE:   Mounjaro 15 mg weekly         Relevant Orders    Referral to Clinical Pharmacy     Clinical Pharmacist follow-up: 3/19/25, Telehealth visit    Continue all meds under the continuation of care with the referring provider and clinical pharmacy team.    Thank you,  Prisca Pruett, PharmD  Clinical Pharmacist  [phone]    Verbal consent to manage patient's drug therapy was obtained from the patient. They were informed they may decline to participate or withdraw from participation in pharmacy services at any time.

## 2025-02-19 NOTE — ASSESSMENT & PLAN NOTE
Current regimen includes Mounjaro 15 mg. Patient's current weight reported as 238 lbs. She is back on track with weight loss. Has lost 48 lbs (16% of TBW) since starting therapy plan. Patient is incorporating more food into her day in small increments. She feels this is working pretty well. Having fewer instances of overeating and feeling sick. Admits she is still eating sweets at work occasionally. She has been using the desk pedaler intermittently but not the treadmill. She will start using exercise bands at home when she orders a new set.    Medication Changes:  CONTINUE:   Mounjaro 15 mg weekly

## 2025-03-03 ENCOUNTER — APPOINTMENT (OUTPATIENT)
Dept: SLEEP MEDICINE | Facility: CLINIC | Age: 50
End: 2025-03-03
Payer: COMMERCIAL

## 2025-03-03 VITALS
SYSTOLIC BLOOD PRESSURE: 111 MMHG | BODY MASS INDEX: 40.97 KG/M2 | TEMPERATURE: 98 F | HEART RATE: 84 BPM | DIASTOLIC BLOOD PRESSURE: 71 MMHG | OXYGEN SATURATION: 97 % | WEIGHT: 240 LBS | HEIGHT: 64 IN

## 2025-03-03 DIAGNOSIS — G47.33 OSA (OBSTRUCTIVE SLEEP APNEA): ICD-10-CM

## 2025-03-03 DIAGNOSIS — G47.23 IRREGULAR SLEEP-WAKE RHYTHM: Primary | ICD-10-CM

## 2025-03-03 PROCEDURE — 3008F BODY MASS INDEX DOCD: CPT | Performed by: STUDENT IN AN ORGANIZED HEALTH CARE EDUCATION/TRAINING PROGRAM

## 2025-03-03 PROCEDURE — 99214 OFFICE O/P EST MOD 30 MIN: CPT | Performed by: STUDENT IN AN ORGANIZED HEALTH CARE EDUCATION/TRAINING PROGRAM

## 2025-03-03 PROCEDURE — 3048F LDL-C <100 MG/DL: CPT | Performed by: STUDENT IN AN ORGANIZED HEALTH CARE EDUCATION/TRAINING PROGRAM

## 2025-03-03 PROCEDURE — 3078F DIAST BP <80 MM HG: CPT | Performed by: STUDENT IN AN ORGANIZED HEALTH CARE EDUCATION/TRAINING PROGRAM

## 2025-03-03 PROCEDURE — 1036F TOBACCO NON-USER: CPT | Performed by: STUDENT IN AN ORGANIZED HEALTH CARE EDUCATION/TRAINING PROGRAM

## 2025-03-03 PROCEDURE — 4010F ACE/ARB THERAPY RXD/TAKEN: CPT | Performed by: STUDENT IN AN ORGANIZED HEALTH CARE EDUCATION/TRAINING PROGRAM

## 2025-03-03 PROCEDURE — 3074F SYST BP LT 130 MM HG: CPT | Performed by: STUDENT IN AN ORGANIZED HEALTH CARE EDUCATION/TRAINING PROGRAM

## 2025-03-03 ASSESSMENT — SLEEP AND FATIGUE QUESTIONNAIRES
HOW LIKELY ARE YOU TO NOD OFF OR FALL ASLEEP WHILE SITTING AND TALKING TO SOMEONE: WOULD NEVER DOZE
HOW LIKELY ARE YOU TO NOD OFF OR FALL ASLEEP WHILE WATCHING TV: MODERATE CHANCE OF DOZING
HOW LIKELY ARE YOU TO NOD OFF OR FALL ASLEEP WHILE SITTING AND READING: SLIGHT CHANCE OF DOZING
SLEEP_PROBLEM_INTERFERES_DAILY_ACTIVITIES: A LITTLE
HOW LIKELY ARE YOU TO NOD OFF OR FALL ASLEEP WHILE SITTING QUIETLY AFTER LUNCH WITHOUT ALCOHOL: WOULD NEVER DOZE
ESS-CHAD TOTAL SCORE: 4
HOW LIKELY ARE YOU TO NOD OFF OR FALL ASLEEP IN A CAR, WHILE STOPPED FOR A FEW MINUTES IN TRAFFIC: WOULD NEVER DOZE
HOW LIKELY ARE YOU TO NOD OFF OR FALL ASLEEP WHILE LYING DOWN TO REST IN THE AFTERNOON WHEN CIRCUMSTANCES PERMIT: SLIGHT CHANCE OF DOZING
HOW LIKELY ARE YOU TO NOD OFF OR FALL ASLEEP WHEN YOU ARE A PASSENGER IN A CAR FOR AN HOUR WITHOUT A BREAK: WOULD NEVER DOZE
SLEEP_PROBLEM_NOTICEABLE_TO_OTHERS: A LITTLE
SITING INACTIVE IN A PUBLIC PLACE LIKE A CLASS ROOM OR A MOVIE THEATER: WOULD NEVER DOZE
SATISFACTION_WITH_CURRENT_SLEEP_PATTERN: VERY SATISFIED
WORRIED_DISTRESSED_DUE_TO_SLEEP: A LITTLE

## 2025-03-03 NOTE — ASSESSMENT & PLAN NOTE
Encouraged pt to motivate herself to use her CPAP machine. Discussed with pt how when she does use the CPAP machine there is significant improvement. Focus on stimulus control (blocking calls around 8pm) so she can focus on remembering to put on her machine and not being distracted.    Encouraged pt to continue to use her CPAP through her congestion.

## 2025-03-03 NOTE — ASSESSMENT & PLAN NOTE
Congratulated pt on losing 20kg. Discussed w pt that if she were to lose around 40 more lbs she may possibly feel little to no sleep symptoms. Encouraged pt to continue her weight loss journey.

## 2025-03-03 NOTE — PROGRESS NOTES
Patient: Ralph Saldaña    [id number]  : 1975 -- AGE 49 y.o.    Provider: Henri Alvarado MD     Location [facility]   Service Date: 3/3/2025              [facility]  Followup Visit Note        Assessment/Plan    Ms. Saldaña is a 49 y.o. female and she returns in followup to the [facility] for the problems listed below on 25     Problem List, Orders, Assessment, Recommendations:  Problem List Items Addressed This Visit             ICD-10-CM    MAIDA (obstructive sleep apnea) G47.33     Encouraged pt to motivate herself to use her CPAP machine. Discussed with pt how when she does use the CPAP machine there is significant improvement. Focus on stimulus control (blocking calls around 8pm) so she can focus on remembering to put on her machine and not being distracted.    Encouraged pt to continue to use her CPAP through her congestion.         Relevant Orders    Follow Up In Adult Sleep Medicine    BMI 40.0-44.9, adult (Multi) Z68.41     Congratulated pt on losing 20kg. Discussed w pt that if she were to lose around 40 more lbs she may possibly feel little to no sleep symptoms. Encouraged pt to continue her weight loss journey.          Irregular sleep-wake rhythm - Primary G47.23     Encouraged pt to have self-motivation and discipline to maintain healthy sleep habits.           Disposition  Return to clinic in 3-4 months          HISTORY OF PRESENT ILLNESS     HISTORY OF PRESENT ILLNESS   Ralph Saldaña is a 49 y.o. female with history of Obesity and MAIDA presents to [facility] for followup.     Assessment and plan from last visit:   Ms. Saldaña is a 49 y.o. female and she returns in followup to the [facility] for the problems listed below on 10/25/24      Problem List, Orders, Assessment, Recommendations:  Problem List Items Addressed This Visit               ICD-10-CM      MAIDA (obstructive sleep apnea) - Primary G47.33       Encouraged usage extension  Had some issues with [facility] with DME transfer, will look into and make sure that she is established with DME           Relevant Orders     Follow Up In Adult Sleep Medicine     Primary hypertension I10           BP Readings from Last 1 Encounters:   10/25/24 123/82      - doing well, asymptomatic  - discussed at length the impact of untreated MAIDA and BP control  - continue current management and follow-up with PCP              BMI 40.0-44.9, adult (Multi) Z68.41           BMI Readings from Last 1 Encounters:   10/25/24 44.32 kg/m²      - encouraged healthy weight loss via diet and exercise  - consider referral to the weight loss program at follow-up visit              Irregular sleep-wake rhythm G47.23       Patient doesn't really have a regular schedule as she often falls asleep on her couch watching TV.  She is afraid to go up to bed too early (as she usually just falls asleep) and wasting her night away.  However, after lengthy discussion, we agreed that we need to break the cycle somewhere.  She agrees that she should start establishing better daily routines and start going to bed earlier and put her PAP machine on     She is also to find a hobby to do and report back next visit               Disposition     Return to clinic in 4 months       Current History    On today's visit, the patient reports that she has not been able to use her therapy as consistently as she would've liked.  She used 6 nights out of 30 in the last month.  However, when she can use it, she typically can keep it on all night.  She often would be talking on her phone with friends and family until she falls asleep, hence, without putting the mask back on.  She reports that she has tried to start a hobby, but bought various things such as puzzle, crocheting kit, but never really started it.    PAP Info  [employer]: [employer]  Issues with PAP Therapy?:  "not using enough  Perceived Benefits of PAP therapy: refreshing sleep    PAP Adherence  6 out of 30 days.  rAHI ~ 3    Daytime Symptoms  Patient reports: no daytime symptoms  Patient denies : excessive daytime sleepiness  Napping habit : Patient denies taking naps.  Fatigue concerns : Patient denies feeling fatigue    ESS: 4  KIKI: 3  FOSQ: 37    PHYSICAL EXAM     VITAL SIGNS: /71 (BP Location: Right arm, Patient Position: Sitting, BP Cuff Size: Large adult)   Pulse 84   Temp 36.7 °C (98 °F) (Tympanic)   Ht 1.626 m (5' 4\")   Wt 109 kg (240 lb)   SpO2 97%   BMI 41.20 kg/m²      PREVIOUS WEIGHTS:  Wt Readings from Last 3 Encounters:   03/03/25 109 kg (240 lb)   01/06/25 112 kg (247 lb)   12/24/24 113 kg (250 lb)      "

## 2025-03-19 ENCOUNTER — APPOINTMENT (OUTPATIENT)
Dept: PHARMACY | Facility: HOSPITAL | Age: 50
End: 2025-03-19
Payer: COMMERCIAL

## 2025-03-19 NOTE — ASSESSMENT & PLAN NOTE
Current regimen includes Mounjaro 15 mg, which patient is tolerating. Patient's current weight reported as 242 lbs. She has gotten off track with her diet recently due to a death in the family and grieving. Has lost 44 lbs (16% of TBW) since starting therapy plan. Patient reports fewer bowel movements than baseline and discussed options. Encouraged hydration and fiber intake. Had success on mag 7 in the past. Recommended a stool softener with stimulant if needed. Emphasized consistent CPAP use.    Medication Changes:  CONTINUE:   Mounjaro 15 mg weekly

## 2025-03-19 NOTE — PROGRESS NOTES
Clinical Pharmacy Appointment    Patient ID: Ralph Saldaña is a 49 y.o. female who presents for Weight Loss.    Pt is here for Follow Up appointment.     Referring Provider: Evangelina Floyd  PCP: Evangelina Floyd MD   Last visit with PCP: 1/6/25   Next visit with PCP: 7/14/25    Subjective   HPI  WEIGHT LOSS  BMI Readings from Last 3 Encounters:   03/03/25 41.20 kg/m²   01/06/25 42.40 kg/m²   12/24/24 42.91 kg/m²      Starting weight: 286 lbs. (2/24/24)  Last weight: 238 lbs  Current weight: 242 lbs.    Lifestyle  Diet: Slowly increasing food intake compared to a month ago. She was eating about one meal a day. Now she is trying to add snacks in addition to that meal. She is trying to supplement with a protein shake as well. Ate more sweets this past week compared to previously.  BK: smoothie  LN: salad and beans  DN: none  Snacks: Jello cups, peanut butter, fruit  Drinks: Water (more since last visit), lemonade, occasional pop  Has worked with nutritionist/dietician? Yes, many years ago  Physical Activity: Started using desk pedaler but not treadmill. Has resistance bands.    Other Potential Contributing Factors  Alcohol use: Average 0-1 drinks/week  Tobacco use: No  Other drug use: No  Medications that may cause weight gain: none  Mental health: No current concerns  Eating Disorders: Denies Hx of any eating disorder  Comorbidities: MAIDA    Non-Pharmacological Therapy  Weight loss techniques attempted:  None    Pharmacological Therapy  Current Medications: Mounjaro 15 mg weekly (Wednesday)  Adverse Effects: Significant appetite suppression, constipation  Previous Medications: Trulicity     Insurance coverage of weight-loss medications? No, Zepbound and Wegovy are not on formulary this year. But patient has diabetes diagnosis and has switched to Mounjaro.  Eligible for copay cards/programs? Yes  Eligible for  PAP? N/A     Drug Interactions  No relevant drug interactions were noted.    Medication  "System Management  Patient's preferred [facility]: [facility]  Adherence/Organization: Takes medications as prescribed  Affordability/Accessibility: No issues    Objective   Allergies   Allergen Reactions    Bupropion Hcl Hives    Penicillins Hives and Unknown    Shellfish Derived Unknown     Social History     Social History Narrative    Not on file      Medication Review  Current Outpatient Medications   Medication Instructions    escitalopram (LEXAPRO) 20 mg, oral, Daily    levonorgestrel (Mirena) 20 mcg/24hr IUD 1 each, Once    losartan (COZAAR) 50 mg, oral, Daily    metoprolol succinate XL (TOPROL-XL) 50 mg, oral, Daily    montelukast (SINGULAIR) 10 mg, oral, Daily    Mounjaro 15 mg, subcutaneous, Every 7 days    triamterene-hydrochlorothiazid (Maxzide-25) 37.5-25 mg tablet 1 tablet, oral, Daily      Vitals  BP Readings from Last 2 Encounters:   03/03/25 111/71   01/06/25 110/73     BMI Readings from Last 1 Encounters:   03/03/25 41.20 kg/m²      Labs  A1C  Lab Results   Component Value Date    HGBA1C 5.9 09/24/2024    HGBA1C 7.4 (H) 06/04/2024    HGBA1C 6.9 (H) 12/19/2023     BMP  Lab Results   Component Value Date    CALCIUM 9.7 01/06/2025     01/06/2025    K 4.1 01/06/2025    CO2 29 01/06/2025     01/06/2025    BUN 15 01/06/2025    CREATININE 0.86 01/06/2025    EGFR 83 01/06/2025     LFTs  Lab Results   Component Value Date    ALT 13 01/06/2025    AST 12 01/06/2025    ALKPHOS 92 01/06/2025    BILITOT 0.6 01/06/2025     FLP  Lab Results   Component Value Date    TRIG 149 01/06/2025    CHOL 175 01/06/2025    LDLF 92 04/20/2023    LDLCALC 98 01/06/2025    HDL 47.5 01/06/2025     Urine Microalbumin  No results found for: \"MICROALBCREA\"  Weight Management  Wt Readings from Last 3 Encounters:   03/03/25 109 kg (240 lb)   01/06/25 112 kg (247 lb)   12/24/24 113 kg (250 lb)      There is no height or weight on file to calculate BMI.     Assessment/Plan   Problem List Items " Addressed This Visit       BMI 40.0-44.9, adult (Multi)     Current regimen includes Mounjaro 15 mg, which patient is tolerating. Patient's current weight reported as 242 lbs. She has gotten off track with her diet recently due to a death in the family and grieving. Has lost 44 lbs (16% of TBW) since starting therapy plan. Patient reports fewer bowel movements than baseline and discussed options. Encouraged hydration and fiber intake. Had success on mag 7 in the past. Recommended a stool softener with stimulant if needed. Emphasized consistent CPAP use.    Medication Changes:  CONTINUE:   Mounjaro 15 mg weekly         Relevant Orders    Referral to Clinical Pharmacy     Clinical Pharmacist follow-up: 4/16/25, Telehealth visit    Continue all meds under the continuation of care with the referring provider and clinical pharmacy team.    Thank you,  Prisca Pruett, PharmD  Clinical Pharmacist  [phone]    Verbal consent to manage patient's drug therapy was obtained from the patient. They were informed they may decline to participate or withdraw from participation in pharmacy services at any time.

## 2025-03-26 DIAGNOSIS — I10 HYPERTENSION, UNSPECIFIED TYPE: ICD-10-CM

## 2025-03-26 DIAGNOSIS — F43.9 STRESS: ICD-10-CM

## 2025-03-31 RX ORDER — ESCITALOPRAM OXALATE 20 MG/1
20 TABLET ORAL DAILY
Qty: 90 TABLET | Refills: 1 | Status: SHIPPED | OUTPATIENT
Start: 2025-03-31

## 2025-03-31 RX ORDER — TRIAMTERENE/HYDROCHLOROTHIAZID 37.5-25 MG
1 TABLET ORAL DAILY
Qty: 90 TABLET | Refills: 1 | Status: SHIPPED | OUTPATIENT
Start: 2025-03-31

## 2025-04-16 ENCOUNTER — APPOINTMENT (OUTPATIENT)
Dept: PHARMACY | Facility: HOSPITAL | Age: 50
End: 2025-04-16
Payer: COMMERCIAL

## 2025-04-16 RX ORDER — TIRZEPATIDE 15 MG/.5ML
15 INJECTION, SOLUTION SUBCUTANEOUS
Qty: 2 ML | Refills: 5 | Status: SHIPPED | OUTPATIENT
Start: 2025-04-16

## 2025-04-16 NOTE — PROGRESS NOTES
Clinical Pharmacy Appointment    Patient ID: Ralph Saldaña is a 49 y.o. female who presents for Weight Loss.    Pt is here for Follow Up appointment.     Referring Provider: Evangelina Floyd  PCP: Evangelina Floyd MD   Last visit with PCP: 1/6/25   Next visit with PCP: 7/14/25    Subjective   HPI  WEIGHT LOSS  BMI Readings from Last 3 Encounters:   03/03/25 41.20 kg/m²   01/06/25 42.40 kg/m²   12/24/24 42.91 kg/m²      Starting weight: 286 lbs. (2/24/24)  Last weight: 242 lbs  Current weight: 240 lbs.    Lifestyle  Diet:   BK: smoothie  LN: salad and beans  DN: none  Snacks: Jello cups, peanut butter, fruit  Drinks: Water, lemonade, occasional pop  Has worked with nutritionist/dietician? Yes, many years ago  Physical Activity: Walking. Has resistance bands.    Other Potential Contributing Factors  Alcohol use: Average 0-1 drinks/week  Tobacco use: No  Other drug use: No  Medications that may cause weight gain: none  Mental health: No current concerns  Eating Disorders: Denies Hx of any eating disorder  Comorbidities: MAIDA    Non-Pharmacological Therapy  Weight loss techniques attempted:  None    Pharmacological Therapy  Current Medications: Mounjaro 15 mg weekly (Wednesday)  Adverse Effects: Significant appetite suppression, sulfur burps  Previous Medications: Trulicity     Insurance coverage of weight-loss medications? No, Zepbound and Wegovy are not on formulary this year. But patient has diabetes diagnosis and has switched to Mounjaro.  Eligible for copay cards/programs? Yes  Eligible for  PAP? N/A     Drug Interactions  No relevant drug interactions were noted.    Medication System Management  [facility]'s preferred [facility]: [facility]  Adherence/Organization: Takes medications as prescribed  Affordability/Accessibility: No issues    Objective   Allergies   Allergen Reactions    Bupropion Hcl Hives    Penicillins Hives and Unknown    Shellfish Derived Unknown  "    Social History     Social History Narrative    Not on file      Medication Review  Current Outpatient Medications   Medication Instructions    escitalopram (LEXAPRO) 20 mg, oral, Daily    levonorgestrel (Mirena) 20 mcg/24hr IUD 1 each, Once    losartan (COZAAR) 50 mg, oral, Daily    metoprolol succinate XL (TOPROL-XL) 50 mg, oral, Daily    montelukast (SINGULAIR) 10 mg, oral, Daily    Mounjaro 15 mg, subcutaneous, Every 7 days    triamterene-hydrochlorothiazid (Maxzide-25) 37.5-25 mg tablet 1 tablet, oral, Daily      Vitals  BP Readings from Last 2 Encounters:   03/03/25 111/71   01/06/25 110/73     BMI Readings from Last 1 Encounters:   03/03/25 41.20 kg/m²      Labs  A1C  Lab Results   Component Value Date    HGBA1C 5.9 09/24/2024    HGBA1C 7.4 (H) 06/04/2024    HGBA1C 6.9 (H) 12/19/2023     BMP  Lab Results   Component Value Date    CALCIUM 9.7 01/06/2025     01/06/2025    K 4.1 01/06/2025    CO2 29 01/06/2025     01/06/2025    BUN 15 01/06/2025    CREATININE 0.86 01/06/2025    EGFR 83 01/06/2025     LFTs  Lab Results   Component Value Date    ALT 13 01/06/2025    AST 12 01/06/2025    ALKPHOS 92 01/06/2025    BILITOT 0.6 01/06/2025     FLP  Lab Results   Component Value Date    TRIG 149 01/06/2025    CHOL 175 01/06/2025    LDLF 92 04/20/2023    LDLCALC 98 01/06/2025    HDL 47.5 01/06/2025     Urine Microalbumin  No results found for: \"MICROALBCREA\"  Weight Management  Wt Readings from Last 3 Encounters:   03/03/25 109 kg (240 lb)   01/06/25 112 kg (247 lb)   12/24/24 113 kg (250 lb)      There is no height or weight on file to calculate BMI.     Assessment/Plan   Problem List Items Addressed This Visit       BMI 40.0-44.9, adult (Multi)    Current regimen includes Mounjaro 15 mg, which patient is tolerating. Patient's current weight reported as 240 lbs. She has been inconsistent with her diet recently due to another death in the family and grieving. Has lost 46 lbs (16% of TBW) since starting " therapy plan. Patient reports bowel movements have improved in the past month and appetite is being significantly suppressed. She has been out walking more. Encouraged to start using resistance bands.    Medication Changes:  CONTINUE:   Mounjaro 15 mg weekly         Relevant Medications    tirzepatide (Mounjaro) 15 mg/0.5 mL pen injector    Other Relevant Orders    Referral to Clinical Pharmacy     Clinical Pharmacist follow-up: 5/14/25, Telehealth visit    Continue all meds under the continuation of care with the referring provider and clinical pharmacy team.    Thank you,  Prisca Pruett, PharmD  Clinical Pharmacist  [phone]    Verbal consent to manage patient's drug therapy was obtained from the patient. They were informed they may decline to participate or withdraw from participation in pharmacy services at any time.

## 2025-04-16 NOTE — ASSESSMENT & PLAN NOTE
Current regimen includes Mounjaro 15 mg, which patient is tolerating. Patient's current weight reported as 240 lbs. She has been inconsistent with her diet recently due to another death in the family and grieving. Has lost 46 lbs (16% of TBW) since starting therapy plan. Patient reports bowel movements have improved in the past month and appetite is being significantly suppressed. She has been out walking more. Encouraged to start using resistance bands.    Medication Changes:  CONTINUE:   Mounjaro 15 mg weekly

## 2025-05-09 NOTE — PROGRESS NOTES
Clinical Pharmacy Appointment    Patient ID: Ralph Saldaña is a 49 y.o. female who presents for Weight Loss.    Pt is here for Follow Up appointment.     Referring Provider: Evangelina Floyd  PCP: Evangelina Floyd MD   Last visit with PCP: 1/6/25   Next visit with PCP: 7/14/25    Subjective   HPI  WEIGHT LOSS  BMI Readings from Last 3 Encounters:   03/03/25 41.20 kg/m²   01/06/25 42.40 kg/m²   12/24/24 42.91 kg/m²      Starting weight: 286 lbs. (2/24/24)  Last weight: 240 lbs  Current weight: 231 lbs.    Lifestyle  Diet:   BK: smoothie  LN: salad and beans  DN: none  Snacks: Jello cups, peanut butter, fruit  Drinks: Water, lemonade, occasional pop  Has worked with nutritionist/dietician? Yes, many years ago  Physical Activity: Walking. Has resistance bands.    Other Potential Contributing Factors  Alcohol use: Average 0-1 drinks/week  Tobacco use: No  Other drug use: No  Medications that may cause weight gain: none  Mental health: No current concerns  Eating Disorders: Denies Hx of any eating disorder  Comorbidities: MAIDA    Non-Pharmacological Therapy  Weight loss techniques attempted:  None    Pharmacological Therapy  Current Medications: Mounjaro 15 mg weekly (Wednesday)  Adverse Effects: Significant appetite suppression, constipation  Previous Medications: Trulicity     Insurance coverage of weight-loss medications? No, Zepbound and Wegovy are not on formulary this year. But patient has diabetes diagnosis and has switched to Mounjaro.  Eligible for copay cards/programs? Yes  Eligible for  PAP? N/A     Drug Interactions  No relevant drug interactions were noted.    Medication System Management  Patient's preferred pharmacy: [facility]  Adherence/Organization: Takes medications as prescribed  Affordability/Accessibility: No issues    Objective   Allergies   Allergen Reactions    Bupropion Hcl Hives    Penicillins Hives and Unknown    Shellfish Derived Unknown  "    Social History     Social History Narrative    Not on file      Medication Review  Current Outpatient Medications   Medication Instructions    escitalopram (LEXAPRO) 20 mg, oral, Daily    levonorgestrel (Mirena) 20 mcg/24hr IUD 1 each, Once    losartan (COZAAR) 50 mg, oral, Daily    metoprolol succinate XL (TOPROL-XL) 50 mg, oral, Daily    montelukast (SINGULAIR) 10 mg, oral, Daily    Mounjaro 15 mg, subcutaneous, Every 7 days    triamterene-hydrochlorothiazid (Maxzide-25) 37.5-25 mg tablet 1 tablet, oral, Daily      Vitals  BP Readings from Last 2 Encounters:   03/03/25 111/71   01/06/25 110/73     BMI Readings from Last 1 Encounters:   03/03/25 41.20 kg/m²      Labs  A1C  Lab Results   Component Value Date    HGBA1C 5.9 09/24/2024    HGBA1C 7.4 (H) 06/04/2024    HGBA1C 6.9 (H) 12/19/2023     BMP  Lab Results   Component Value Date    CALCIUM 9.7 01/06/2025     01/06/2025    K 4.1 01/06/2025    CO2 29 01/06/2025     01/06/2025    BUN 15 01/06/2025    CREATININE 0.86 01/06/2025    EGFR 83 01/06/2025     LFTs  Lab Results   Component Value Date    ALT 13 01/06/2025    AST 12 01/06/2025    ALKPHOS 92 01/06/2025    BILITOT 0.6 01/06/2025     FLP  Lab Results   Component Value Date    TRIG 149 01/06/2025    CHOL 175 01/06/2025    LDLF 92 04/20/2023    LDLCALC 98 01/06/2025    HDL 47.5 01/06/2025     Urine Microalbumin  No results found for: \"MICROALBCREA\"  Weight Management  Wt Readings from Last 3 Encounters:   03/03/25 109 kg (240 lb)   01/06/25 112 kg (247 lb)   12/24/24 113 kg (250 lb)      There is no height or weight on file to calculate BMI.     Assessment/Plan   Problem List Items Addressed This Visit       BMI 40.0-44.9, adult (Multi)    Current regimen includes Mounjaro 15 mg, which patient is tolerating. Patient's current weight reported as 231 lbs. Has lost 55 lbs (19% of TBW) since starting therapy plan. Patient reports bowel movements are less frequent again. Recommended OTC Miralax or " Metamucil as well as increasing water intake. Patient reports she has not been drinking adequate fluids. Emphasized importance for bowel movements and discussed ideas for increasing intake.     Medication Changes:  CONTINUE:   Mounjaro 15 mg weekly         Relevant Orders    Referral to Clinical Pharmacy     Clinical Pharmacist follow-up: 6/11/25, Telehealth visit    Continue all meds under the continuation of care with the referring provider and clinical pharmacy team.    Thank you,  Prisca Pruett, PharmD  Clinical Pharmacist  [phone]    Verbal consent to manage patient's drug therapy was obtained from the patient. They were informed they may decline to participate or withdraw from participation in pharmacy services at any time.

## 2025-05-14 ENCOUNTER — APPOINTMENT (OUTPATIENT)
Dept: PHARMACY | Facility: HOSPITAL | Age: 50
End: 2025-05-14
Payer: COMMERCIAL

## 2025-05-14 NOTE — ASSESSMENT & PLAN NOTE
Current regimen includes Mounjaro 15 mg, which patient is tolerating. Patient's current weight reported as 231 lbs. Has lost 55 lbs (19% of TBW) since starting therapy plan. Patient reports bowel movements are less frequent again. Recommended OTC Miralax or Metamucil as well as increasing water intake. Patient reports she has not been drinking adequate fluids. Emphasized importance for bowel movements and discussed ideas for increasing intake.     Medication Changes:  CONTINUE:   Mounjaro 15 mg weekly

## 2025-05-19 ENCOUNTER — APPOINTMENT (OUTPATIENT)
Dept: PRIMARY CARE | Facility: CLINIC | Age: 50
End: 2025-05-19
Payer: COMMERCIAL

## 2025-05-19 ENCOUNTER — OFFICE VISIT (OUTPATIENT)
Dept: URGENT CARE | Age: 50
End: 2025-05-19
Payer: COMMERCIAL

## 2025-05-19 VITALS
DIASTOLIC BLOOD PRESSURE: 84 MMHG | OXYGEN SATURATION: 97 % | HEART RATE: 86 BPM | TEMPERATURE: 98.2 F | RESPIRATION RATE: 18 BRPM | SYSTOLIC BLOOD PRESSURE: 135 MMHG

## 2025-05-19 DIAGNOSIS — J01.90 ACUTE SINUSITIS, RECURRENCE NOT SPECIFIED, UNSPECIFIED LOCATION: Primary | ICD-10-CM

## 2025-05-19 PROCEDURE — 4010F ACE/ARB THERAPY RXD/TAKEN: CPT | Performed by: PHYSICIAN ASSISTANT

## 2025-05-19 PROCEDURE — 3048F LDL-C <100 MG/DL: CPT | Performed by: PHYSICIAN ASSISTANT

## 2025-05-19 PROCEDURE — 99203 OFFICE O/P NEW LOW 30 MIN: CPT | Performed by: PHYSICIAN ASSISTANT

## 2025-05-19 PROCEDURE — 3075F SYST BP GE 130 - 139MM HG: CPT | Performed by: PHYSICIAN ASSISTANT

## 2025-05-19 PROCEDURE — 3079F DIAST BP 80-89 MM HG: CPT | Performed by: PHYSICIAN ASSISTANT

## 2025-05-19 PROCEDURE — 1036F TOBACCO NON-USER: CPT | Performed by: PHYSICIAN ASSISTANT

## 2025-05-19 RX ORDER — FLUTICASONE PROPIONATE 50 MCG
1 SPRAY, SUSPENSION (ML) NASAL DAILY PRN
Qty: 16 G | Refills: 0 | Status: SHIPPED | OUTPATIENT
Start: 2025-05-19 | End: 2026-05-19

## 2025-05-19 RX ORDER — DOXYCYCLINE 100 MG/1
100 CAPSULE ORAL 2 TIMES DAILY
Qty: 20 CAPSULE | Refills: 0 | Status: SHIPPED | OUTPATIENT
Start: 2025-05-19 | End: 2025-05-29

## 2025-05-19 ASSESSMENT — ENCOUNTER SYMPTOMS
ABDOMINAL PAIN: 0
FEVER: 0
SHORTNESS OF BREATH: 0
DIARRHEA: 0
SINUS COMPLAINT: 1
VOMITING: 0
WHEEZING: 0

## 2025-05-19 NOTE — PROGRESS NOTES
"Subjective   Patient ID: Ralph Saldaña \"Yola" is a 49 y.o. female. They present today with a chief complaint of Nasal Congestion, sinus pressure, and Earache (6 days).    History of Present Illness    Sinus Problem  Location:  Facial pressure, nasal congestion, cough, sore throat, bilateral ear pressure  Severity:  Moderate  Onset quality:  Gradual  Timing:  Constant  Progression:  Worsening  Relieved by:  Nothing  Ineffective treatments:  Mucinex  Associated symptoms: no abdominal pain, no chest pain, no diarrhea, no fever, no rash, no shortness of breath, no vomiting and no wheezing        Past Medical History  Allergies as of 05/19/2025 - Reviewed 05/19/2025   Allergen Reaction Noted    Bupropion hcl Hives 04/13/2023    Penicillins Hives and Unknown 12/16/2011    Shellfish derived Unknown 04/13/2023       Prescriptions Prior to Admission[1]     Medical History[2]    Surgical History[3]     reports that she has never smoked. She has never used smokeless tobacco. She reports that she does not currently use alcohol. She reports that she does not use drugs.    Review of Systems  Review of Systems   Constitutional:  Negative for fever.   Respiratory:  Negative for shortness of breath and wheezing.    Cardiovascular:  Negative for chest pain.   Gastrointestinal:  Negative for abdominal pain, diarrhea and vomiting.   Skin:  Negative for rash.         Objective    Vitals:    05/19/25 1441   BP: 135/84   Pulse: 86   Resp: 18   Temp: 36.8 °C (98.2 °F)   SpO2: 97%     No LMP recorded. (Menstrual status: IUD).    Physical Exam  Constitutional:       General: She is not in acute distress.     Appearance: Normal appearance.   HENT:      Head: Normocephalic and atraumatic.      Right Ear: Tympanic membrane normal.      Left Ear: Tympanic membrane normal.      Nose: Congestion and rhinorrhea present.      Mouth/Throat:      Mouth: Mucous membranes are moist.      Pharynx: No oropharyngeal exudate or posterior oropharyngeal " erythema.   Eyes:      Conjunctiva/sclera: Conjunctivae normal.   Cardiovascular:      Rate and Rhythm: Normal rate and regular rhythm.   Pulmonary:      Effort: Pulmonary effort is normal.      Breath sounds: Normal breath sounds. No wheezing, rhonchi or rales.   Musculoskeletal:      Cervical back: Neck supple.   Lymphadenopathy:      Cervical: No cervical adenopathy.   Skin:     General: Skin is warm and dry.      Findings: No rash.   Neurological:      General: No focal deficit present.      Mental Status: She is alert and oriented to person, place, and time.   Psychiatric:         Mood and Affect: Mood normal.         Procedures    Point of Care Test & Imaging Results from this visit  No results found for this visit on 05/19/25.   Imaging  No results found.    Cardiology, Vascular, and Other Imaging  No other imaging results found for the past 2 days      Diagnostic study results (if any) were reviewed by Jovanna Chavarria PA-C.    Assessment/Plan   Allergies, medications, history, and pertinent labs/EKGs/Imaging reviewed by Jovanna Chavarria PA-C.     Medical Decision Making    Concern for bacterial sinusitis  Will start doxy, flonase  Discussed other supportive care, rest, fluids, tylenol/advil as needed for pain  Work note provided    At time of discharge patient was clinically well-appearing and HDS for outpatient management. The patient and/or family was educated regarding diagnosis, supportive care, OTC and Rx medications. The patient and/or family was given the opportunity to ask questions prior to discharge.  They verbalized understanding of my discussion of the plans for treatment, expected course, indications to return to  or seek further evaluation in ED, and the need for timely follow up as directed. They were provided with a work/school excuse if requested.          Orders and Diagnoses  Diagnoses and all orders for this visit:  Acute sinusitis, recurrence not specified, unspecified location  -      fluticasone (Flonase) 50 mcg/actuation nasal spray; Administer 1 spray into each nostril once daily as needed for rhinitis. Shake gently. Before first use, prime pump. After use, clean tip and replace cap.  -     doxycycline (Vibramycin) 100 mg capsule; Take 1 capsule (100 mg) by mouth 2 times a day for 10 days. Take with at least 8 ounces (large glass) of water, do not lie down for 30 minutes after      Return to Urgent care if symptoms return or progress  Follow up with PCP in 1-2 weeks         Patient disposition: Home    Electronically signed by Jovanna Chavarria PA-C  6:07 PM           [1] (Not in a hospital admission)   [2]   Past Medical History:  Diagnosis Date    Body mass index (BMI) 45.0-49.9, adult (Multi) 12/03/2021    BMI 45.0-49.9, adult    Obstructive sleep apnea (adult) (pediatric) 01/28/2020    Obstructive sleep apnea of adult    Personal history of diseases of the skin and subcutaneous tissue 06/21/2022    History of eczema   [3]   Past Surgical History:  Procedure Laterality Date    BREAST SURGERY Bilateral     reduction

## 2025-05-19 NOTE — LETTER
May 19, 2025     Patient: Ralph Saldaña   YOB: 1975   Date of Visit: 5/19/2025       To Whom It May Concern:    Ralph Saldaña was seen in my clinic on 5/19/2025 at 2:50 pm. Please excuse Ralph for her absence from work on this day to make the appointment. She may return to work on 5/21/2025.    If you have any questions or concerns, please don't hesitate to call.         Sincerely,         Jovanna Chavarria PA-C

## 2025-06-11 ENCOUNTER — APPOINTMENT (OUTPATIENT)
Dept: PHARMACY | Facility: HOSPITAL | Age: 50
End: 2025-06-11
Payer: COMMERCIAL

## 2025-06-11 NOTE — PROGRESS NOTES
"  Clinical Pharmacy Appointment    Patient ID: Ralph Saldaña \"Yola" is a 49 y.o. female who presents for Weight Loss.    Pt is here for Follow Up appointment.     Referring Provider: Evangelina Floyd  PCP: Evangelina Floyd MD   Last visit with PCP: 1/6/25   Next visit with PCP: 7/14/25    Subjective   HPI  WEIGHT LOSS  BMI Readings from Last 3 Encounters:   03/03/25 41.20 kg/m²   01/06/25 42.40 kg/m²   12/24/24 42.91 kg/m²      Starting weight: 286 lbs. (2/24/24)  Last weight: 231 lbs  Current weight: 239 lbs.    Lifestyle  Diet:   BK: smoothie  LN: salad and beans  DN: none  Snacks: Jello cups, peanut butter, fruit  Drinks: Water, lemonade, occasional pop  Has worked with nutritionist/dietician? Yes, many years ago  Physical Activity: Walking. Has resistance bands.    Other Potential Contributing Factors  Alcohol use: Average 0-1 drinks/week  Tobacco use: No  Other drug use: No  Medications that may cause weight gain: none  Mental health: No current concerns  Eating Disorders: Denies Hx of any eating disorder  Comorbidities: MAIDA    Non-Pharmacological Therapy  Weight loss techniques attempted:  None    Pharmacological Therapy  Current Medications: Mounjaro 15 mg weekly (Wednesday)  Adverse Effects: Significant appetite suppression, constipation  Previous Medications: Trulicity     Insurance coverage of weight-loss medications? No, Zepbound and Wegovy are not on formulary this year. But patient has diabetes diagnosis and has switched to Mounjaro.  Eligible for copay cards/programs? Yes  Eligible for  PAP? N/A     Drug Interactions  No relevant drug interactions were noted.    Medication System Management  Patient's preferred pharmacy: [facility]  Adherence/Organization: Takes medications as prescribed  Affordability/Accessibility: No issues    Objective   Allergies   Allergen Reactions    Bupropion Hcl Hives    Penicillins Hives and Unknown    Shellfish Derived " "Unknown     Social History     Social History Narrative    Not on file      Medication Review  Current Outpatient Medications   Medication Instructions    escitalopram (LEXAPRO) 20 mg, oral, Daily    fluticasone (Flonase) 50 mcg/actuation nasal spray 1 spray, Each Nostril, Daily PRN, Shake gently. Before first use, prime pump. After use, clean tip and replace cap.    levonorgestrel (Mirena) 20 mcg/24hr IUD 1 each, Once    losartan (COZAAR) 50 mg, oral, Daily    metoprolol succinate XL (TOPROL-XL) 50 mg, oral, Daily    montelukast (SINGULAIR) 10 mg, oral, Daily    Mounjaro 15 mg, subcutaneous, Every 7 days    triamterene-hydrochlorothiazid (Maxzide-25) 37.5-25 mg tablet 1 tablet, oral, Daily      Vitals  BP Readings from Last 2 Encounters:   05/19/25 135/84   03/03/25 111/71     BMI Readings from Last 1 Encounters:   03/03/25 41.20 kg/m²      Labs  A1C  Lab Results   Component Value Date    HGBA1C 5.9 09/24/2024    HGBA1C 7.4 (H) 06/04/2024    HGBA1C 6.9 (H) 12/19/2023     BMP  Lab Results   Component Value Date    CALCIUM 9.7 01/06/2025     01/06/2025    K 4.1 01/06/2025    CO2 29 01/06/2025     01/06/2025    BUN 15 01/06/2025    CREATININE 0.86 01/06/2025    EGFR 83 01/06/2025     LFTs  Lab Results   Component Value Date    ALT 13 01/06/2025    AST 12 01/06/2025    ALKPHOS 92 01/06/2025    BILITOT 0.6 01/06/2025     FLP  Lab Results   Component Value Date    TRIG 149 01/06/2025    CHOL 175 01/06/2025    LDLF 92 04/20/2023    LDLCALC 98 01/06/2025    HDL 47.5 01/06/2025     Urine Microalbumin  No results found for: \"MICROALBCREA\"  Weight Management  Wt Readings from Last 3 Encounters:   03/03/25 109 kg (240 lb)   01/06/25 112 kg (247 lb)   12/24/24 113 kg (250 lb)      There is no height or weight on file to calculate BMI.     Assessment/Plan   Problem List Items Addressed This Visit       BMI 40.0-44.9, adult (Multi)    Current regimen includes Mounjaro 15 mg, which patient is tolerating. Patient's " current weight reported as 239 lbs. Has lost 47 lbs (16% of TBW) since starting therapy plan. Patient reports bowel movements are more regular now and that she has increased her fluid intake since last visit. Discussed incorporating more walking and resistance exercises.    Medication Changes:  CONTINUE:   Mounjaro 15 mg weekly         Relevant Orders    Referral to Clinical Pharmacy     Clinical Pharmacist follow-up: 7/9/25, Telehealth visit    Continue all meds under the continuation of care with the referring provider and clinical pharmacy team.    Thank you,  Prisca Pruett, PharmD  Clinical Pharmacist  [phone]    Verbal consent to manage patient's drug therapy was obtained from the patient. They were informed they may decline to participate or withdraw from participation in pharmacy services at any time.

## 2025-06-16 ENCOUNTER — HOSPITAL ENCOUNTER (OUTPATIENT)
Dept: RADIOLOGY | Facility: CLINIC | Age: 50
Discharge: HOME | End: 2025-06-16
Payer: COMMERCIAL

## 2025-06-16 VITALS — BODY MASS INDEX: 41.03 KG/M2 | WEIGHT: 240.3 LBS | HEIGHT: 64 IN

## 2025-06-16 DIAGNOSIS — Z12.39 BREAST SCREENING: ICD-10-CM

## 2025-06-16 PROCEDURE — 77067 SCR MAMMO BI INCL CAD: CPT

## 2025-06-16 PROCEDURE — 77067 SCR MAMMO BI INCL CAD: CPT | Performed by: STUDENT IN AN ORGANIZED HEALTH CARE EDUCATION/TRAINING PROGRAM

## 2025-06-16 PROCEDURE — 77063 BREAST TOMOSYNTHESIS BI: CPT | Performed by: STUDENT IN AN ORGANIZED HEALTH CARE EDUCATION/TRAINING PROGRAM

## 2025-07-09 ENCOUNTER — APPOINTMENT (OUTPATIENT)
Dept: PHARMACY | Facility: HOSPITAL | Age: 50
End: 2025-07-09
Payer: COMMERCIAL

## 2025-07-09 NOTE — PROGRESS NOTES
"  Clinical Pharmacy Appointment    Patient ID: Ralph Saldaña \"Yola" is a 49 y.o. female who presents for Weight Management.    Pt is here for Follow Up appointment.     Referring Provider: Evangelina Floyd  PCP: Evangelina Floyd MD   Last visit with PCP: 1/6/25   Next visit with PCP: 7/22/25    Subjective   HPI  WEIGHT LOSS  BMI Readings from Last 3 Encounters:   06/16/25 41.23 kg/m²   03/03/25 41.20 kg/m²   01/06/25 42.40 kg/m²      Starting weight: 286 lbs. (2/24/24)  Last weight: 239 lbs  Current weight: 225 lbs.    Lifestyle  Diet:   BK: smoothie  LN: salad and beans  DN: none  Snacks: Jello cups, peanut butter, fruit  Drinks: Water, lemonade, occasional pop  Has worked with nutritionist/dietician? Yes, many years ago  Physical Activity: Walking. Has resistance bands.    Other Potential Contributing Factors  Alcohol use: Average 0-1 drinks/week  Tobacco use: No  Other drug use: No  Medications that may cause weight gain: none  Mental health: No current concerns  Eating Disorders: Denies Hx of any eating disorder  Comorbidities: MAIDA    Non-Pharmacological Therapy  Weight loss techniques attempted:  None    Pharmacological Therapy  Current Medications: Mounjaro 15 mg weekly (Wednesday)  Adverse Effects: Significant appetite suppression, constipation  Previous Medications: Trulicity     Insurance coverage of weight-loss medications? No, Zepbound and Wegovy are not on formulary this year. But patient has diabetes diagnosis and has switched to Mounjaro.  Eligible for copay cards/programs? Yes  Eligible for  PAP? N/A     Drug Interactions  No relevant drug interactions were noted.    Medication System Management  Patient's preferred [facility]: [facility]  Adherence/Organization: Takes medications as prescribed  Affordability/Accessibility: No issues    Objective   Allergies   Allergen Reactions    Bupropion Hcl Hives    Penicillins Hives and Unknown    Shellfish Derived " "Unknown     Social History     Social History Narrative    Not on file      Medication Review  Current Outpatient Medications   Medication Instructions    escitalopram (LEXAPRO) 20 mg, oral, Daily    fluticasone (Flonase) 50 mcg/actuation nasal spray 1 spray, Each Nostril, Daily PRN, Shake gently. Before first use, prime pump. After use, clean tip and replace cap.    levonorgestrel (Mirena) 20 mcg/24hr IUD 1 each, Once    losartan (COZAAR) 50 mg, oral, Daily    metoprolol succinate XL (TOPROL-XL) 50 mg, oral, Daily    montelukast (SINGULAIR) 10 mg, oral, Daily    Mounjaro 15 mg, subcutaneous, Every 7 days    triamterene-hydrochlorothiazid (Maxzide-25) 37.5-25 mg tablet 1 tablet, oral, Daily      Vitals  BP Readings from Last 2 Encounters:   05/19/25 135/84   03/03/25 111/71     BMI Readings from Last 1 Encounters:   06/16/25 41.23 kg/m²      Labs  A1C  Lab Results   Component Value Date    HGBA1C 5.9 09/24/2024    HGBA1C 7.4 (H) 06/04/2024    HGBA1C 6.9 (H) 12/19/2023     BMP  Lab Results   Component Value Date    CALCIUM 9.7 01/06/2025     01/06/2025    K 4.1 01/06/2025    CO2 29 01/06/2025     01/06/2025    BUN 15 01/06/2025    CREATININE 0.86 01/06/2025    EGFR 83 01/06/2025     LFTs  Lab Results   Component Value Date    ALT 13 01/06/2025    AST 12 01/06/2025    ALKPHOS 92 01/06/2025    BILITOT 0.6 01/06/2025     FLP  Lab Results   Component Value Date    TRIG 149 01/06/2025    CHOL 175 01/06/2025    LDLF 92 04/20/2023    LDLCALC 98 01/06/2025    HDL 47.5 01/06/2025     Urine Microalbumin  No results found for: \"MICROALBCREA\"  Weight Management  Wt Readings from Last 3 Encounters:   06/16/25 109 kg (240 lb 4.8 oz)   03/03/25 109 kg (240 lb)   01/06/25 112 kg (247 lb)      There is no height or weight on file to calculate BMI.     Assessment/Plan   Problem List Items Addressed This Visit       BMI 40.0-44.9, adult (Multi)    Current regimen includes Mounjaro 15 mg, which patient is tolerating. " Patient's current weight reported as 225 lbs. Has lost 61 lbs (21% of TBW) since starting therapy plan. Patient reports bowel movements continue to be regular and she's keeping up with water intake. Discussion centered on [facility] requiring her to fill 3 months at a time and cost, however insurance does not allow [facility] to fill 3 months at a time and patient is responsible for full cost if one month is billed. She will need to stay with [facility] for the best price.    Medication Changes:  CONTINUE:   Mounjaro 15 mg weekly         Relevant Orders    Referral to Clinical Pharmacy     Clinical Pharmacist follow-up: 8/6/25, Telehealth visit    Continue all meds under the continuation of care with the referring provider and clinical pharmacy team.    Thank you,  Prisca Pruett, PharmD  Clinical Pharmacist  [phone]    Verbal consent to manage patient's drug therapy was obtained from the patient. They were informed they may decline to participate or withdraw from participation in pharmacy services at any time.

## 2025-07-09 NOTE — ASSESSMENT & PLAN NOTE
Current regimen includes Mounjaro 15 mg, which patient is tolerating. Patient's current weight reported as 225 lbs. Has lost 61 lbs (21% of TBW) since starting therapy plan. Patient reports bowel movements continue to be regular and she's keeping up with water intake. Discussion centered on [facility] requiring her to fill 3 months at a time and cost, however insurance does not allow [facility] to fill 3 months at a time and patient is responsible for full cost if one month is billed. She will need to stay with [facility] for the best price.    Medication Changes:  CONTINUE:   Mounjaro 15 mg weekly

## 2025-07-14 ENCOUNTER — APPOINTMENT (OUTPATIENT)
Dept: PRIMARY CARE | Facility: CLINIC | Age: 50
End: 2025-07-14
Payer: COMMERCIAL

## 2025-07-22 ENCOUNTER — APPOINTMENT (OUTPATIENT)
Dept: PRIMARY CARE | Facility: CLINIC | Age: 50
End: 2025-07-22
Payer: COMMERCIAL

## 2025-07-22 VITALS
SYSTOLIC BLOOD PRESSURE: 114 MMHG | WEIGHT: 224.1 LBS | DIASTOLIC BLOOD PRESSURE: 78 MMHG | TEMPERATURE: 97.7 F | BODY MASS INDEX: 38.26 KG/M2 | HEIGHT: 64 IN | OXYGEN SATURATION: 96 % | HEART RATE: 84 BPM

## 2025-07-22 DIAGNOSIS — F43.9 STRESS: ICD-10-CM

## 2025-07-22 DIAGNOSIS — I10 HYPERTENSION, UNSPECIFIED TYPE: ICD-10-CM

## 2025-07-22 PROCEDURE — G8433 SCR FOR DEP NOT CPT DOC RSN: HCPCS | Performed by: FAMILY MEDICINE

## 2025-07-22 PROCEDURE — 3008F BODY MASS INDEX DOCD: CPT | Performed by: FAMILY MEDICINE

## 2025-07-22 PROCEDURE — 99214 OFFICE O/P EST MOD 30 MIN: CPT | Performed by: FAMILY MEDICINE

## 2025-07-22 PROCEDURE — 3078F DIAST BP <80 MM HG: CPT | Performed by: FAMILY MEDICINE

## 2025-07-22 PROCEDURE — 4010F ACE/ARB THERAPY RXD/TAKEN: CPT | Performed by: FAMILY MEDICINE

## 2025-07-22 PROCEDURE — 3074F SYST BP LT 130 MM HG: CPT | Performed by: FAMILY MEDICINE

## 2025-07-22 RX ORDER — LOSARTAN POTASSIUM 25 MG/1
25 TABLET ORAL DAILY
Qty: 100 TABLET | Refills: 3 | Status: SHIPPED | OUTPATIENT
Start: 2025-07-22 | End: 2026-08-26

## 2025-07-22 ASSESSMENT — ENCOUNTER SYMPTOMS
LOSS OF SENSATION IN FEET: 0
OCCASIONAL FEELINGS OF UNSTEADINESS: 0
DEPRESSION: 0

## 2025-07-22 ASSESSMENT — PATIENT HEALTH QUESTIONNAIRE - PHQ9
2. FEELING DOWN, DEPRESSED OR HOPELESS: NOT AT ALL
1. LITTLE INTEREST OR PLEASURE IN DOING THINGS: NOT AT ALL
SUM OF ALL RESPONSES TO PHQ9 QUESTIONS 1 AND 2: 0

## 2025-07-22 ASSESSMENT — COLUMBIA-SUICIDE SEVERITY RATING SCALE - C-SSRS
1. IN THE PAST MONTH, HAVE YOU WISHED YOU WERE DEAD OR WISHED YOU COULD GO TO SLEEP AND NOT WAKE UP?: NO
6. HAVE YOU EVER DONE ANYTHING, STARTED TO DO ANYTHING, OR PREPARED TO DO ANYTHING TO END YOUR LIFE?: NO
2. HAVE YOU ACTUALLY HAD ANY THOUGHTS OF KILLING YOURSELF?: NO

## 2025-07-22 ASSESSMENT — PAIN SCALES - GENERAL: PAINLEVEL_OUTOF10: 0-NO PAIN

## 2025-07-22 NOTE — PATIENT INSTRUCTIONS
Probiotic , co q , fish oil capsule , vit d  2000 international units , calcium 1200 ,     Exercise 45 min per day --- 500 kcal or 73834  steps --- lifting weights resistant bands , pilate at least twice a week so that you will build muscle    Let them theory book     Losartan dropped to 25 mg

## 2025-07-22 NOTE — PROGRESS NOTES
49-year-old female follow-up        She is on Mounjaro and losing weight she is very happy    Will reduce losartan to 25    Provided information to continue with lifestyle changes    Will see her back in January for her annual physical    Health maintenance mammogram done on June 17 was normal    Pap done December 2024 by gynecology was normal    All the labs done in January was good    Will refer her to nutritionist to get more advice about eating a balanced diet    She is to continue escitalopram it has been helping her with anxiety depression

## 2025-08-06 ENCOUNTER — APPOINTMENT (OUTPATIENT)
Dept: PHARMACY | Facility: HOSPITAL | Age: 50
End: 2025-08-06
Payer: COMMERCIAL

## 2025-08-06 NOTE — ASSESSMENT & PLAN NOTE
Current regimen includes Mounjaro 15 mg, which patient is tolerating. Patient's current weight reported as 230 lbs. Has lost 56 lbs (20% of TBW) since starting therapy plan. Patient reports bowel movements continue to be regular and she's keeping up with water intake. Continues to walk, but hasn't started using resistance bands. She will schedule with nutrition after this weekend. She has been traveling a lot recently so progress has stalled. Patient will get back on track and follow up in 6 weeks.    Medication Changes:  CONTINUE:   Mounjaro 15 mg weekly

## 2025-08-06 NOTE — PROGRESS NOTES
"  Clinical Pharmacy Appointment    Patient ID: Ralph Saldaña \"Yola" is a 50 y.o. female who presents for Weight Management.    Pt is here for Follow Up appointment.     Referring Provider: Evangelina Floyd  PCP: Evangelina Floyd MD   Last visit with PCP: 7/22/25   Next visit with PCP: 1/23/25    Subjective   HPI  WEIGHT LOSS  BMI Readings from Last 3 Encounters:   07/22/25 38.47 kg/m²   06/16/25 41.23 kg/m²   03/03/25 41.20 kg/m²      Starting weight: 286 lbs. (2/24/24)  Last weight: 225 lbs  Current weight: 230 lbs.    Lifestyle  Diet:   BK: smoothie  LN: salad and beans  DN: none  Snacks: Jello cups, peanut butter, fruit  Drinks: Water, lemonade, occasional pop  Has worked with nutritionist/dietician? Yes, many years ago  Physical Activity: Walking. Has resistance bands.    Other Potential Contributing Factors  Alcohol use: Average 0-1 drinks/week  Tobacco use: No  Other drug use: No  Medications that may cause weight gain: none  Mental health: No current concerns  Eating Disorders: Denies Hx of any eating disorder  Comorbidities: MAIDA    Non-Pharmacological Therapy  Weight loss techniques attempted:  None    Pharmacological Therapy  Current Medications: Mounjaro 15 mg weekly (Wednesday)  Adverse Effects: Significant appetite suppression, constipation  Previous Medications: Trulicity     Insurance coverage of weight-loss medications? No, Zepbound and Wegovy are not on formulary this year. But patient has diabetes diagnosis and has switched to Mounjaro.  Eligible for copay cards/programs? Yes  Eligible for  PAP? N/A     Drug Interactions  No relevant drug interactions were noted.    Medication System Management  Patient's preferred pharmacy: [facility]  Adherence/Organization: Takes medications as prescribed  Affordability/Accessibility: No issues    Objective   Allergies   Allergen Reactions    Bupropion Hcl Hives    Penicillins Hives and Unknown    Shellfish " "Derived Unknown     Social History     Social History Narrative    Not on file      Medication Review  Current Outpatient Medications   Medication Instructions    escitalopram (LEXAPRO) 20 mg, oral, Daily    fluticasone (Flonase) 50 mcg/actuation nasal spray 1 spray, Each Nostril, Daily PRN, Shake gently. Before first use, prime pump. After use, clean tip and replace cap.    levonorgestrel (Mirena) 20 mcg/24hr IUD 1 each, Once    losartan (COZAAR) 25 mg, oral, Daily    metoprolol succinate XL (TOPROL-XL) 50 mg, oral, Daily    montelukast (SINGULAIR) 10 mg, oral, Daily    Mounjaro 15 mg, subcutaneous, Every 7 days    triamterene-hydrochlorothiazid (Maxzide-25) 37.5-25 mg tablet 1 tablet, oral, Daily      Vitals  BP Readings from Last 2 Encounters:   07/22/25 114/78   05/19/25 135/84     BMI Readings from Last 1 Encounters:   07/22/25 38.47 kg/m²      Labs  A1C  Lab Results   Component Value Date    HGBA1C 5.9 09/24/2024    HGBA1C 7.4 (H) 06/04/2024    HGBA1C 6.9 (H) 12/19/2023     BMP  Lab Results   Component Value Date    CALCIUM 9.7 01/06/2025     01/06/2025    K 4.1 01/06/2025    CO2 29 01/06/2025     01/06/2025    BUN 15 01/06/2025    CREATININE 0.86 01/06/2025    EGFR 83 01/06/2025     LFTs  Lab Results   Component Value Date    ALT 13 01/06/2025    AST 12 01/06/2025    ALKPHOS 92 01/06/2025    BILITOT 0.6 01/06/2025     FLP  Lab Results   Component Value Date    TRIG 149 01/06/2025    CHOL 175 01/06/2025    LDLF 92 04/20/2023    LDLCALC 98 01/06/2025    HDL 47.5 01/06/2025     Urine Microalbumin  No results found for: \"MICROALBCREA\"  Weight Management  Wt Readings from Last 3 Encounters:   07/22/25 102 kg (224 lb 1.6 oz)   06/16/25 109 kg (240 lb 4.8 oz)   03/03/25 109 kg (240 lb)      There is no height or weight on file to calculate BMI.     Assessment/Plan   Problem List Items Addressed This Visit       BMI 40.0-44.9, adult (Multi) - Primary    Current regimen includes Mounjaro 15 mg, which " patient is tolerating. Patient's current weight reported as 230 lbs. Has lost 56 lbs (20% of TBW) since starting therapy plan. Patient reports bowel movements continue to be regular and she's keeping up with water intake. Continues to walk, but hasn't started using resistance bands. She will schedule with nutrition after this weekend. She has been traveling a lot recently so progress has stalled. Patient will get back on track and follow up in 6 weeks.    Medication Changes:  CONTINUE:   Mounjaro 15 mg weekly         Relevant Orders    Referral to Clinical Pharmacy     Clinical Pharmacist follow-up: 9/17/25, Telehealth visit    Continue all meds under the continuation of care with the referring provider and clinical pharmacy team.    Thank you,  Prisca Pruett, PharmD  Clinical Pharmacist  [phone]    Verbal consent to manage patient's drug therapy was obtained from the patient. They were informed they may decline to participate or withdraw from participation in pharmacy services at any time.

## 2025-09-17 ENCOUNTER — APPOINTMENT (OUTPATIENT)
Dept: PHARMACY | Facility: HOSPITAL | Age: 50
End: 2025-09-17
Payer: COMMERCIAL

## 2025-12-30 ENCOUNTER — APPOINTMENT (OUTPATIENT)
Dept: OBSTETRICS AND GYNECOLOGY | Facility: CLINIC | Age: 50
End: 2025-12-30
Payer: COMMERCIAL

## 2026-01-23 ENCOUNTER — APPOINTMENT (OUTPATIENT)
Dept: SLEEP MEDICINE | Facility: CLINIC | Age: 51
End: 2026-01-23
Payer: COMMERCIAL